# Patient Record
Sex: MALE | Race: WHITE | HISPANIC OR LATINO | Employment: FULL TIME | URBAN - METROPOLITAN AREA
[De-identification: names, ages, dates, MRNs, and addresses within clinical notes are randomized per-mention and may not be internally consistent; named-entity substitution may affect disease eponyms.]

---

## 2020-02-11 ENCOUNTER — OFFICE VISIT (OUTPATIENT)
Dept: URGENT CARE | Facility: CLINIC | Age: 50
End: 2020-02-11
Payer: COMMERCIAL

## 2020-02-11 VITALS
DIASTOLIC BLOOD PRESSURE: 72 MMHG | SYSTOLIC BLOOD PRESSURE: 120 MMHG | HEART RATE: 62 BPM | RESPIRATION RATE: 18 BRPM | BODY MASS INDEX: 27.32 KG/M2 | WEIGHT: 170 LBS | HEIGHT: 66 IN

## 2020-02-11 DIAGNOSIS — Z02.83 ENCOUNTER FOR DRUG SCREENING: ICD-10-CM

## 2020-02-11 DIAGNOSIS — S39.012A STRAIN OF LUMBAR REGION, INITIAL ENCOUNTER: Primary | ICD-10-CM

## 2020-02-11 DIAGNOSIS — S39.012A STRAIN OF LUMBAR PARASPINOUS MUSCLE, INITIAL ENCOUNTER: ICD-10-CM
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
## 2020-02-11 DIAGNOSIS — M54.41 ACUTE RIGHT-SIDED LOW BACK PAIN WITH RIGHT-SIDED SCIATICA: ICD-10-CM

## 2020-02-11 LAB
CTP QC/QA: YES
POC 5 PANEL DRUG SCREEN: NEGATIVE

## 2020-02-11 PROCEDURE — 80305 DRUG TEST PRSMV DIR OPT OBS: CPT | Mod: QW,S$GLB,, | Performed by: NURSE PRACTITIONER

## 2020-02-11 PROCEDURE — 72110 X-RAY EXAM L-2 SPINE 4/>VWS: CPT | Mod: FY,S$GLB,, | Performed by: RADIOLOGY

## 2020-02-11 PROCEDURE — 80305 POCT RAPID DRUG SCREEN 5 PANEL: ICD-10-PCS | Mod: QW,S$GLB,, | Performed by: NURSE PRACTITIONER

## 2020-02-11 PROCEDURE — 99204 PR OFFICE/OUTPT VISIT, NEW, LEVL IV, 45-59 MIN: ICD-10-PCS | Mod: S$GLB,,, | Performed by: NURSE PRACTITIONER

## 2020-02-11 PROCEDURE — 99204 OFFICE O/P NEW MOD 45 MIN: CPT | Mod: S$GLB,,, | Performed by: NURSE PRACTITIONER

## 2020-02-11 PROCEDURE — 72110 XR LUMBAR SPINE COMPLETE 5 VIEW: ICD-10-PCS | Mod: FY,S$GLB,, | Performed by: RADIOLOGY

## 2020-02-11 RX ORDER — ASPIRIN 81 MG/1
81 TABLET ORAL EVERY 4 HOURS PRN
COMMUNITY

## 2020-02-11 NOTE — LETTER
[facility]  [address]  [place] [address]  Phone: [phone]  Fax: [phone]  [facility]: [id number]    Pt Name: Kulwinder Rodriguez  Injury Date: 02/06/2020   Employee ID: [id number] Date of First Treatment: 02/11/2020   Company: [employer]      Appointment Time: 09:10 AM Arrived: 9:18 a.m.   Provider: FANI Stallings Time Out: 10:39 a.m.     Office Treatment:   EXAM  X-Ray  Drug Screen    1. Strain of lumbar region, initial encounter    2. Acute right-sided low back pain with right-sided sciatica    3. Strain of lumbar paraspinous muscle, initial encounter    4. Work related injury    5. Encounter for drug screening          Patient Instructions: Attention not to aggravate affected area, Apply ice 24-48 hours then apply heat/warm soaks(ibuprofen for pain)    Restrictions: Avoid frequent bending/lifting/twisting, No lifting/pushing/pulling more than 10 lbs     Return Appointment: 2/18/2020 at 1:30 p.m .  [place]

## 2020-02-11 NOTE — PROGRESS NOTES
Subjective:       Patient ID: Kulwinder Rodriguez is a 49 y.o. male.    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 Later on his states the pain started to radiate down his right leg. He went to the nurse onsite and was given bio freeze and aspirin to use at home. Patient presents today with complaint of no change since injuring and sharp/throbbing pain. He also has pain and discomfort in upper back. Pain level 6/10 on today. [place]      Back Pain   This is a new problem. The current episode started 1 to 4 weeks ago. The problem occurs constantly. The problem is unchanged. The pain is present in the lumbar spine. Quality: sharp and throbbing. The pain radiates to the right thigh. The pain is at a severity of 6/10. The pain is moderate. The pain is the same all the time. The symptoms are aggravated by bending. Pertinent negatives include no abdominal pain, bladder incontinence, bowel incontinence, chest pain, dysuria, fever, headaches, leg pain, numbness, paresis, paresthesias, pelvic pain, perianal numbness, tingling, weakness or weight loss. He has tried analgesics (biofreeze) for the symptoms. The treatment provided mild relief.       Constitution: Negative for fatigue and fever.   HENT: Negative.    Neck: negative.   Cardiovascular: Negative.  Negative for chest pain.   Eyes: Negative.    Respiratory: Negative.    Gastrointestinal: Negative for abdominal pain and bowel incontinence.   Endocrine: negative.   Genitourinary: Negative for dysuria, urgency, bladder incontinence, hematuria and pelvic pain.   Musculoskeletal: Positive for pain, back pain, pain with walking and muscle cramps. Negative for history of spine disorder.   Skin: Negative for rash.   Allergic/Immunologic: Negative.    Neurological: Negative for coordination disturbances, headaches, numbness and tingling.    Hematologic/Lymphatic: Negative.    Psychiatric/Behavioral: Negative.         Objective:      Physical Exam   Constitutional: He appears well-developed and well-nourished. He is active. No distress.   HENT:   Head: Normocephalic and atraumatic.   Right Ear: Hearing and external ear normal.   Left Ear: Hearing and external ear normal.   Nose: Nose normal. No nasal deformity. No epistaxis.   Mouth/Throat: Oropharynx is clear and moist and mucous membranes are normal.   Eyes: Conjunctivae and lids are normal. Right conjunctiva is not injected. Left conjunctiva is not injected.   Neck: Trachea normal and normal range of motion. No spinous process tenderness and no muscular tenderness present.   Cardiovascular: Intact distal pulses and normal pulses.   Pulses:       Dorsalis pedis pulses are 2+ on the right side, and 2+ on the left side.        Posterior tibial pulses are 2+ on the right side, and 2+ on the left side.   Pulmonary/Chest: Effort normal. No stridor. No respiratory distress.   Abdominal: Normal appearance.   Musculoskeletal: He exhibits edema and tenderness.        Cervical back: Normal.        Thoracic back: Normal.        Lumbar back: He exhibits decreased range of motion, tenderness, pain and spasm. He exhibits no deformity and normal pulse.        Back:    Neurological: He is alert. He has normal strength and normal reflexes. No sensory deficit. GCS eye subscore is 4. GCS verbal subscore is 5. GCS motor subscore is 6.   Reflex Scores:       Patellar reflexes are 2+ on the right side and 2+ on the left side.       Achilles reflexes are 2+ on the right side and 2+ on the left side.  SLR negative bilaterally.    Skin: Skin is warm, dry and intact. No abrasion and no bruising noted.   Psychiatric: He has a normal mood and affect. His speech is normal and behavior is normal. Thought content normal. Cognition and memory are normal. He is attentive.   Nursing note and vitals reviewed.    X-ray Lumbar Spine  Complete 5 View    Result Date: 2/11/2020  EXAMINATION: XR LUMBAR SPINE COMPLETE 5 VIEW CLINICAL HISTORY: Low back pain, <6wks, no red flags, no prior management;WORKERS COMP;Lumbago with sciatica, right side TECHNIQUE: AP, lateral, spot and bilateral oblique views of the lumbar spine were performed. COMPARISON: None FINDINGS: No acute fracture with preserved vertebral body heights and pedicles.  Some scattered tiny anterolateral end plate osteophytes.  No spondylolysis or spondylolisthesis.  Some mild degenerative changes about the L4-L5 and L5-S1 facet articulations.  Questionable mild disc narrowing at the lumbosacral level, disc space levels otherwise preserved.  Visualized SI joints and hip joint spaces intact.     No acute fracture, some mild degenerative changes as noted Electronically signed by: Chava Montana Date:    02/11/2020 Time:    10:15      Assessment:       1. Strain of lumbar region, initial encounter    2. Acute right-sided low back pain with right-sided sciatica    3. Strain of lumbar paraspinous muscle, initial encounter    4. Work related injury        Plan:            Patient Instructions: Attention not to aggravate affected area, Apply ice 24-48 hours then apply heat/warm soaks(ibuprofen for pain)   Restrictions: Avoid frequent bending/lifting/twisting, No lifting/pushing/pulling more than 10 lbs  Follow up in about 1 week (around 2/18/2020).

## 2020-02-18 ENCOUNTER — OFFICE VISIT (OUTPATIENT)
Dept: URGENT CARE | Facility: CLINIC | Age: 50
End: 2020-02-18
Payer: COMMERCIAL

## 2020-02-18 DIAGNOSIS — M54.41 ACUTE RIGHT-SIDED LOW BACK PAIN WITH RIGHT-SIDED SCIATICA: Primary | ICD-10-CM

## 2020-02-18 DIAGNOSIS — S39.012D STRAIN OF LUMBAR REGION, SUBSEQUENT ENCOUNTER: ICD-10-CM

## 2020-02-18 PROCEDURE — 99214 PR OFFICE/OUTPT VISIT, EST, LEVL IV, 30-39 MIN: ICD-10-PCS | Mod: S$GLB,,, | Performed by: PREVENTIVE MEDICINE

## 2020-02-18 PROCEDURE — 99214 OFFICE O/P EST MOD 30 MIN: CPT | Mod: S$GLB,,, | Performed by: PREVENTIVE MEDICINE

## 2020-02-18 RX ORDER — ETODOLAC 400 MG/1
400 TABLET, FILM COATED ORAL 2 TIMES DAILY
Qty: 60 TABLET | Refills: 1 | Status: SHIPPED | OUTPATIENT
Start: 2020-02-18 | End: 2020-03-03 | Stop reason: SDUPTHER

## 2020-02-18 RX ORDER — METHOCARBAMOL 500 MG/1
500 TABLET, FILM COATED ORAL NIGHTLY
Qty: 30 TABLET | Refills: 1 | Status: SHIPPED | OUTPATIENT
Start: 2020-02-18 | End: 2020-03-03 | Stop reason: SDUPTHER

## 2020-02-18 NOTE — PROGRESS NOTES
Subjective:       Patient ID: Kulwinder Rodriguez is a 49 y.o. male.    Chief Complaint: Back Pain    Pt returned to the clinic today for a follow up visit for back pain. Pt states his injury has not improved or changed since his last visit. Pt takes ibuprofen for his back and it helps relieves some of the pain. IJ      Back Pain   This is a recurrent problem. The current episode started 1 to 4 weeks ago. The problem occurs constantly. The problem is unchanged. The pain is present in the lumbar spine. Quality: sharp and throbbing. The pain radiates to the right thigh. The pain is at a severity of 6/10. The pain is moderate. The pain is the same all the time. The symptoms are aggravated by bending. Pertinent negatives include no abdominal pain, bladder incontinence, bowel incontinence, chest pain, dysuria, fever, headaches, leg pain, numbness, paresis, paresthesias, pelvic pain, perianal numbness, tingling, weakness or weight loss. He has tried analgesics (biofreeze) for the symptoms. The treatment provided mild relief.       Constitution: Negative for fever.   Cardiovascular: Negative for chest pain.   Gastrointestinal: Negative for abdominal pain and bowel incontinence.   Genitourinary: Negative for dysuria, bladder incontinence and pelvic pain.   Musculoskeletal: Positive for back pain.   Neurological: Negative for headaches and numbness.        Objective:      Physical Exam   Constitutional: He is oriented to person, place, and time. He appears well-developed and well-nourished.   HENT:   Head: Normocephalic.   Eyes: Pupils are equal, round, and reactive to light.   Neck: Normal range of motion.   Cardiovascular: Normal rate.   Pulmonary/Chest: Effort normal.   Musculoskeletal: Normal range of motion.        Lumbar back: He exhibits tenderness, pain and spasm. He exhibits no swelling, no edema, no deformity and no laceration.        Back:    Patient has complaints of pain about his right low back radiating to his right  leg with both palpation and range of motion testing.  He has pain with forward flexion of his back to 90°, extension to 10°, and lateral bending and rotation to 25°.  He has no motor or sensory deficits about his lower extremities.    There is no evidence of symptom magnification with negative axial loading and pseudo trunk rotation.   Neurological: He is alert and oriented to person, place, and time.   No focal neurologic deficits   Skin: Skin is warm and dry.   Psychiatric: He has a normal mood and affect.   Nursing note and vitals reviewed.    X-ray Lumbar Spine Complete 5 View    Result Date: 2/11/2020  EXAMINATION: XR LUMBAR SPINE COMPLETE 5 VIEW CLINICAL HISTORY: Low back pain, <6wks, no red flags, no prior management;WORKERS COMP;Lumbago with sciatica, right side TECHNIQUE: AP, lateral, spot and bilateral oblique views of the lumbar spine were performed. COMPARISON: None FINDINGS: No acute fracture with preserved vertebral body heights and pedicles.  Some scattered tiny anterolateral end plate osteophytes.  No spondylolysis or spondylolisthesis.  Some mild degenerative changes about the L4-L5 and L5-S1 facet articulations.  Questionable mild disc narrowing at the lumbosacral level, disc space levels otherwise preserved.  Visualized SI joints and hip joint spaces intact.     No acute fracture, some mild degenerative changes as noted Electronically signed by: Chava Montana Date:    02/11/2020 Time:    10:15  Assessment:       1. Acute right-sided low back pain with right-sided sciatica    2. Strain of lumbar region, subsequent encounter        Plan:     discussed results of this patient's x-rays of the lumbosacral spine which revealed no acute fracture or bony abnormality.  He will begin physical therapy to his right low back with exercises demonstrated in the office.       Patient Instructions: Daily home exercises/warm soaks, Begin Physical Therapy, PT to be scheduled once authorized   Restrictions: No  lifting/pushing/pulling more than 10 lbs, Avoid frequent bending/lifting/twisting  Follow up in about 2 weeks (around 3/3/2020).

## 2020-03-03 ENCOUNTER — OFFICE VISIT (OUTPATIENT)
Dept: URGENT CARE | Facility: CLINIC | Age: 50
End: 2020-03-03
Payer: COMMERCIAL

## 2020-03-03 DIAGNOSIS — S39.012D STRAIN OF LUMBAR REGION, SUBSEQUENT ENCOUNTER: ICD-10-CM

## 2020-03-03 DIAGNOSIS — M54.41 ACUTE RIGHT-SIDED LOW BACK PAIN WITH RIGHT-SIDED SCIATICA: Primary | ICD-10-CM

## 2020-03-03 PROCEDURE — 99214 OFFICE O/P EST MOD 30 MIN: CPT | Mod: S$GLB,,, | Performed by: PREVENTIVE MEDICINE

## 2020-03-03 PROCEDURE — 99214 PR OFFICE/OUTPT VISIT, EST, LEVL IV, 30-39 MIN: ICD-10-PCS | Mod: S$GLB,,, | Performed by: PREVENTIVE MEDICINE

## 2020-03-03 RX ORDER — METHOCARBAMOL 500 MG/1
500 TABLET, FILM COATED ORAL NIGHTLY
Qty: 30 TABLET | Refills: 1 | Status: SHIPPED | OUTPATIENT
Start: 2020-03-03 | End: 2020-04-28 | Stop reason: SDUPTHER

## 2020-03-03 RX ORDER — ETODOLAC 400 MG/1
400 TABLET, FILM COATED ORAL 2 TIMES DAILY
Qty: 60 TABLET | Refills: 1 | Status: SHIPPED | OUTPATIENT
Start: 2020-03-03 | End: 2020-04-28 | Stop reason: SDUPTHER

## 2020-03-03 NOTE — LETTER
[facility]  [address]  [place] [address]  Phone: [phone]  Fax: [phone]  [facility]: [id number]    Pt Name: Kulwinder Rodriguez  Injury Date: 02/06/2020   Employee ID: [id number] Date of Treatment: 03/03/2020   Company: [employer]      Appointment Time: 01:15 PM Arrived: 1:30 PM   Provider: Ayush Gaston MD Time Out: 2:45 PM     Office Treatment:   EXAM  RX GIVEN  BEGAN PHYSICAL THERAPY   LIGHT DUTY     1. Acute right-sided low back pain with right-sided sciatica    2. Strain of lumbar region, subsequent encounter      Medications Ordered This Encounter   Medications    etodolac (LODINE) 400 MG tablet    methocarbamol (ROBAXIN) 500 MG Tab      Patient Instructions: Daily home exercises/warm soaks, Begin Physical Therapy, PT to be scheduled once authorized    Restrictions: No lifting/pushing/pulling more than 10 lbs, Avoid frequent bending/lifting/twisting     Return Appointment: 03/10/2020 at 12:30 PM  CHRISTIAN

## 2020-03-03 NOTE — PROGRESS NOTES
Subjective:       Patient ID: Kulwinder Rodriguez is a 49 y.o. male.    Chief Complaint: Back Pain    F/U- Lower back (DOI 2/6/2020) [place]- Patient states his back feels the same and he still has sharp shooting pain off and on. He states the medication does make him feel a little better. He is awaiting approval for physical therapy and has not heard anything back about it. He is currently taking Robaxin and Etodolac. Pain level 6/10 on today. [place]    Back Pain   This is a recurrent problem. The current episode started 1 to 4 weeks ago. The problem occurs constantly. The problem is unchanged. The pain is present in the lumbar spine. The quality of the pain is described as shooting (sharp). The pain does not radiate. The pain is at a severity of 6/10. The pain is moderate. The pain is the same all the time. The symptoms are aggravated by bending. Stiffness is present in the morning. Pertinent negatives include no abdominal pain, bladder incontinence, bowel incontinence, chest pain, dysuria, fever, headaches, leg pain, numbness, paresis, paresthesias, pelvic pain, perianal numbness, tingling, weakness or weight loss. He has tried NSAIDs and muscle relaxant for the symptoms. The treatment provided moderate relief.       Constitution: Negative for fatigue and fever.   Cardiovascular: Negative for chest pain.   Eyes: Negative.    Respiratory: Negative.    Gastrointestinal: Negative for abdominal pain and bowel incontinence.   Endocrine: negative.   Genitourinary: Negative for dysuria, urgency, bladder incontinence, hematuria and pelvic pain.   Musculoskeletal: Positive for pain, back pain and muscle ache. Negative for muscle cramps and history of spine disorder.   Skin: Negative for rash.   Allergic/Immunologic: Negative.    Neurological: Negative for coordination disturbances, headaches, numbness and tingling.   Hematologic/Lymphatic: Negative.    Psychiatric/Behavioral: Negative.         Objective:      Physical Exam    Constitutional: He is oriented to person, place, and time. He appears well-developed and well-nourished.   HENT:   Head: Normocephalic.   Eyes: Pupils are equal, round, and reactive to light.   Neck: Normal range of motion.   Cardiovascular: Normal rate.   Pulmonary/Chest: Effort normal.   Musculoskeletal: Normal range of motion.        Lumbar back: He exhibits tenderness, pain and spasm. He exhibits no swelling, no edema, no deformity and no laceration.        Back:    Patient continues to complain of pain about his right low back radiating to his right leg with both palpation and range of motion testing.  He has pain with forward flexion of his back to 90°, extension to 10°, and lateral bending and rotation to 25°.  He has no motor or sensory deficits about his lower extremities.    There is no evidence of symptom magnification with negative axial loading and pseudo trunk rotation.   Neurological: He is alert and oriented to person, place, and time.   No focal neurologic deficits   Skin: Skin is warm and dry.   Psychiatric: He has a normal mood and affect.   Nursing note and vitals reviewed.      Assessment:       1. Acute right-sided low back pain with right-sided sciatica    2. Strain of lumbar region, subsequent encounter        Plan:     awaiting physical therapy to this patient's lower back.  He will continue restricted duty, warm soaks with exercises daily.    Medications Ordered This Encounter   Medications    etodolac (LODINE) 400 MG tablet     Sig: Take 1 tablet (400 mg total) by mouth 2 (two) times daily.     Dispense:  60 tablet     Refill:  1    methocarbamol (ROBAXIN) 500 MG Tab     Sig: Take 1 tablet (500 mg total) by mouth nightly.     Dispense:  30 tablet     Refill:  1     Patient Instructions: Daily home exercises/warm soaks, Begin Physical Therapy, PT to be scheduled once authorized   Restrictions: No lifting/pushing/pulling more than 10 lbs, Avoid frequent bending/lifting/twisting  Follow  up in about 1 week (around 3/10/2020).

## 2020-03-10 ENCOUNTER — OFFICE VISIT (OUTPATIENT)
Dept: URGENT CARE | Facility: CLINIC | Age: 50
End: 2020-03-10
Payer: COMMERCIAL

## 2020-03-10 DIAGNOSIS — M54.41 ACUTE RIGHT-SIDED LOW BACK PAIN WITH RIGHT-SIDED SCIATICA: Primary | ICD-10-CM

## 2020-03-10 DIAGNOSIS — S39.012D STRAIN OF LUMBAR REGION, SUBSEQUENT ENCOUNTER: ICD-10-CM

## 2020-03-10 PROCEDURE — 99214 OFFICE O/P EST MOD 30 MIN: CPT | Mod: S$GLB,,, | Performed by: PREVENTIVE MEDICINE

## 2020-03-10 PROCEDURE — 99214 PR OFFICE/OUTPT VISIT, EST, LEVL IV, 30-39 MIN: ICD-10-PCS | Mod: S$GLB,,, | Performed by: PREVENTIVE MEDICINE

## 2020-03-10 NOTE — LETTER
[facility]  [address] [place] [address]  Phone: [phone]  Fax: [phone]  [facility]: [id number]    Pt Name: Kulwinder Rodriguez  Injury Date: 02/06/2020   Employee ID:[id number] Date of Treatment: 03/10/2020   Company: [employer]      Appointment Time: 12:15 PM Arrived: 12:30 PM   Provider: Ayush Gaston MD Time Out: 2:25 PM     Office Treatment:   EXAM  PT TO BE SCHEDULED ONCE AUTHORIZED   LIGHT DUTY      1. Acute right-sided low back pain with right-sided sciatica    2. Strain of lumbar region, subsequent encounter          Patient Instructions: Daily home exercises/warm soaks, Begin Physical Therapy, PT to be scheduled once authorized    Restrictions: No lifting/pushing/pulling more than 10 lbs, Avoid frequent bending/lifting/twisting     Return Appointment: 03/31/2020 at 11:30 AM  IJ

## 2020-03-10 NOTE — PROGRESS NOTES
Subjective:       Patient ID: Kulwinder Rodriguez is a 49 y.o. male.    Chief Complaint: Back Pain    Pt returned to the clinic today for a follow up visit for back pain. Pt states his pain is a 6/10. Constant and pt have not started physical therapy yet. IJ    Back Pain   This is a recurrent problem. The current episode started 1 to 4 weeks ago. The problem occurs constantly. The problem is unchanged. The pain is present in the lumbar spine. The quality of the pain is described as shooting (sharp). The pain does not radiate. The pain is at a severity of 6/10. The pain is moderate. The pain is the same all the time. The symptoms are aggravated by bending. Stiffness is present in the morning. Pertinent negatives include no abdominal pain, bladder incontinence, bowel incontinence, chest pain, dysuria, fever, headaches, leg pain, numbness, paresis, paresthesias, pelvic pain, perianal numbness, tingling, weakness or weight loss. He has tried NSAIDs and muscle relaxant for the symptoms. The treatment provided moderate relief.       Constitution: Negative for fever.   Cardiovascular: Negative for chest pain.   Gastrointestinal: Negative for abdominal pain and bowel incontinence.   Genitourinary: Negative for dysuria, bladder incontinence and pelvic pain.   Musculoskeletal: Positive for back pain.   Neurological: Negative for headaches and numbness.        Objective:      Physical Exam   Constitutional: He is oriented to person, place, and time. He appears well-developed and well-nourished.   HENT:   Head: Normocephalic.   Eyes: Pupils are equal, round, and reactive to light.   Neck: Normal range of motion.   Cardiovascular: Normal rate.   Pulmonary/Chest: Effort normal.   Musculoskeletal: Normal range of motion.        Lumbar back: He exhibits tenderness, pain and spasm. He exhibits no swelling, no edema, no deformity and no laceration.        Back:    Patient continues to complain of pain about his right low back radiating to  his right leg with both palpation and range of motion testing.  He has pain with forward flexion of his back to 90°, extension to 10°, and lateral bending and rotation to 25°.  He has no motor or sensory deficits about his lower extremities.    There is no evidence of symptom magnification with negative axial loading and pseudo trunk rotation.   Neurological: He is alert and oriented to person, place, and time.   No focal neurologic deficits   Skin: Skin is warm and dry.   Psychiatric: He has a normal mood and affect.   Nursing note and vitals reviewed.      Assessment:       1. Acute right-sided low back pain with right-sided sciatica    2. Strain of lumbar region, subsequent encounter        Plan:     patient is physical therapy has not yet begun and this was to be done a soon as possible.  He will continue taking etodolac 400 mg twice a day with food and Robaxin 500 mg nightly.  He will remain on restricted light duty for the next 3 weeks and get therapy 3 times per week.       Patient Instructions: Daily home exercises/warm soaks, Begin Physical Therapy, PT to be scheduled once authorized   Restrictions: No lifting/pushing/pulling more than 10 lbs, Avoid frequent bending/lifting/twisting  Follow up in about 3 weeks (around 3/31/2020).

## 2020-03-14 ENCOUNTER — NURSE TRIAGE (OUTPATIENT)
Dept: ADMINISTRATIVE | Facility: CLINIC | Age: 50
End: 2020-03-14

## 2020-03-15 NOTE — TELEPHONE ENCOUNTER
Cough; SOB; weakness; headache, runny. Afebrile. Wants to know where he could get tested. Advised that he did not meet testing criteria. Verbalized understanding.     Reason for Disposition   [1] No CORONAVIRUS EXPOSURE BUT [2] questions about    Additional Information   Negative: Severe difficulty breathing (e.g., struggling for each breath, speak in single words, bluish lips)   Negative: Sounds like a life-threatening emergency to the triager   Negative: [1] Difficulty breathing occurs AND [2] within 14 days of CORONAVIRUS EXPOSURE   Negative: Patient sounds very sick or weak to the triager   Negative: [1] Fever or feeling feverish AND [2] within 14 Days of CORONAVIRUS EXPOSURE   Negative: [1] Cough occurs AND [2] within 14 days of CORONAVIRUS EXPOSURE   Negative: [1] Fever or feeling feverish AND [2] symptoms of lower respiratory illness (e.g., cough, difficulty breathing) AND [3] TRAVEL FROM [place] within last 14 days   Negative: [1] Body aches, chills, diarrhea, headache, runny nose, or sore throat occur AND [2] within 14 days of CORONAVIRUS EXPOSURE   Negative: [1] CORONAVIRUS EXPOSURE within last 14 days AND [2] NO cough, fever, or breathing difficulty   Negative: [1] TRAVEL FROM [place] in last 14 days AND  [2] NO cough or fever or breathing difficulty    Protocols used: CORONAVIRUS (2019-NCOV) EXPOSURE-A-

## 2020-03-18 ENCOUNTER — CLINICAL SUPPORT (OUTPATIENT)
Dept: REHABILITATION | Facility: HOSPITAL | Age: 50
End: 2020-03-18
Payer: COMMERCIAL

## 2020-03-18 DIAGNOSIS — M54.41 ACUTE RIGHT-SIDED LOW BACK PAIN WITH RIGHT-SIDED SCIATICA: ICD-10-CM

## 2020-03-18 PROBLEM — M54.50 LOWER BACK PAIN: Status: ACTIVE | Noted: 2020-03-18

## 2020-03-18 PROCEDURE — 97110 THERAPEUTIC EXERCISES: CPT | Mod: PN

## 2020-03-18 PROCEDURE — 97140 MANUAL THERAPY 1/> REGIONS: CPT | Mod: PN

## 2020-03-18 PROCEDURE — 97161 PT EVAL LOW COMPLEX 20 MIN: CPT | Mod: PN

## 2020-03-18 NOTE — PLAN OF CARE
[facility]  Physical Therapy Initial Evaluation    Name: Kulwinder Rodriguez  Clinic Number: [id number]    Therapy Diagnosis:   Encounter Diagnosis   Name Primary?    Acute right-sided low back pain with right-sided sciatica      Physician: Ayush Gaston MD    Physician Orders: PT Eval and Treat   Medical Diagnosis from Referral:   M54.41 (ICD-10-CM) - Acute right-sided low back pain with right-sided sciatica   S39.012D (ICD-10-CM) - Strain of lumbar region, subsequent encounter     Evaluation Date: 3/18/2020  Authorization Period Expiration: 2/17/2021  Plan of Care Expiration: 4/17  Visit # / Visits authorized: 1/ 12    Time In: 800am  Time Out: 900am  Total Appointment Time (timed & untimed codes): 60 minutes    Precautions: Standard    Subjective   Date of injury: 2/6/2020  History of current condition - KULWINDER reports: he was flipping a can cylinder and pulled his lower back on the right. Afterwards followed up with MD and was Rx meds. Reports that the medications helps a little when his lower back gets tight, but does not take the pain away. Reports that he is currently working - light duty. 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   Reports overall since his injury - reports some days much better and some days feels about the same - not any worse.   Works 4 10 hour days and 3 days off.     Per provider initial note:   W/C New injury- Lower back (DOI 2/6/2020)- Patient states he was flipping a can cylinder and felt a pull in his back and he continued to work. Later on his states the pain started to radiate down his right leg. He went to the nurse onsite and was given bio freeze and aspirin to use at home. Patient presents today with complaint of no change since injuring and sharp/throbbing pain. He also has pain and discomfort in upper back. Pain level 6/10 on today.   This is a new problem. The current episode started 1 to 4  weeks ago. The problem occurs constantly. The problem is unchanged. The pain is present in the lumbar spine. Quality: sharp and throbbing. The pain radiates to the right thigh. The pain is at a severity of 6/10. The pain is moderate. The pain is the same all the time. The symptoms are aggravated by bending. Pertinent negatives include no abdominal pain, bladder incontinence, bowel incontinence, chest pain, dysuria, fever, headaches, leg pain, numbness, paresis, paresthesias, pelvic pain, perianal numbness, tingling, weakness or weight loss. He has tried analgesics (biofreeze) for the symptoms. The treatment provided mild relief.      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   Prior Medical Treatment: Medication and Imaging  Current Work Restrictions: Restrictions: No lifting/pushing/pulling more than 10 lbs, Avoid frequent bending/lifting/twisting  Next MD follow up: Follow up in about 3 weeks (around 3/31/2020).    Medical History:   No past medical history on file.    Surgical History:   Kulwinder Rodriguez  has no past surgical history on file.    Medications:   Kulwinder has a current medication list which includes the following prescription(s): aspirin, etodolac, menthol, and methocarbamol.    Allergies:   Review of patient's allergies indicates:  No Known Allergies     Imaging, Xray: No acute fracture with preserved vertebral body heights and pedicles.  Some scattered tiny anterolateral end plate osteophytes.  No spondylolysis or spondylolisthesis.  Some mild degenerative changes about the L4-L5 and L5-S1 facet articulations.  Questionable mild disc narrowing at the lumbosacral level, disc space levels otherwise preserved.  Visualized SI joints and hip joint spaces intact.     Social History: 0 BEATRIZ lives with their family, unable to cut his grass right now due to  LBP    Pain:  Current 5/10, worst 8/10, best 0/10   Location: Right side of the lower back mainly and when his s/s are worse he reports that his s/s travel down the posterior/lateral leg into the foot (desribes this an a numbness sensation)  Description: Aching and Sharp Occasionally   Aggravating Factors: Bending, Coughing/Sneezing, Flexing, Lifting and Sitting on a lower bench (after sits down on a lower chair has trouble moving from sitting to standing)  Alleviating Factors: meditation, relaxation and warm soak, Biofreeze    Pt's goals: Return to gainful employment         Reduce pain in his lower back and Right leg, improve stiffness in the lower back, get back to PLOF    Objective     Observations: Quiet stance with equal weight shift, ASIS/PSIS appears symmetrical    Lumbar AROM :   Lumbar AROM (*) = degrees current Comments   Flexion  46 R sided LBP and radiates into the R posterior upper leg    Extension  15 (with repeated extension no change in s/s and now 18*) R sided LBP    R Rotation  80 % no signs or symptoms, stiffness   L Rotation  50 % R sided LBP    R Side bending  21 no signs or symptoms   L Side bending   15 R sided LBP      - R/L Rot w/ hips stabilized, difference: No  - Quadrant R/L: (+) Right  - Charisse's sign with return to neutral: (-) though aberrant motion noted  - Tends to hinge with return to N from flexed position from approximately: L4/5 region  Neuro screening:   - Dermatomes: WFL  - Myotomes: WFL except for pain with testing with L2 testing on the L with c/o R sided LBP as well as R sided L4-S2 testing on the (R) though no weakness noted  - UMN screening: WNL  - Slump testing (for provocation of s/s): (+) for concordant R sided LBP and radicular s/s on the Right  - SLR (for provocation of s/s): (+) Right and also (+) Cross SLR test on the (L)  Palpation: increased lumbar paraspinal mm tone, denies ttp to lumbar paraspinals  Response to LAD and hooklying position: min relief with LAD    Joint PAIVM testing: No s/s with CPA; however, discomfort with R UPA at L5/S1 > L4/L5    Functional screening: lifting mechanics, pushing, pulling NT due to s/s today - will test at a later date when more appropriate    Physical Demand Level (PDL) Reference   Physical Demand Level (PDL) Occasional: 1-33% of the Workday (O) Frequent: 34-66% of the Workday (F) Constant: 67% of the Workday (C)   Sedentary (S) 0 - 10 lbs. Negligible Negligible   Light (L) 11 - 20 lbs. 0 - 10 lbs, Negligible   Medium (M) 21 - 50 lbs. 11 - 25lbs. 0 - 10 lbs   Heavy (H) 51 - 100 lbs. 26 - 50 lbs. 10 - 20 lbs.   Very Heavy (VH) 100 + lbs. 50+ lbs. 20+ lbs.        X lbs. Current PDL PDL Goal   Lifting Floor to Waist 10lb restrictions  Sedentary Heavy   Lifting Waist to Shoulder 10lb restrictions  Sedentary Heavy   Lifting Shoulder to Overhead 10lb restrictions  Sedentary Heavy   Pushing 10lb restrictions  Sedentary Heavy   Pulling 10lb restrictions  Sedentary Heavy   Carrying 10lb restrictions  Sedentary Heavy       Limitation/Restriction for FOTO Lumbar Survey    Therapist reviewed FOTO scores for Kulwinder Rodriguez on 3/18/2020.   FOTO documents entered into [employer] - see Media section.    Limitation Score: 44%  Goal: 28%       TREATMENT   Treatment Time In: 835  Treatment Time Out: 900  Total Treatment time (time-based codes) separate from Evaluation: 25 minutes       KULWINDER received the following treatment separate from the evaluation:     KULWINDER received therapeutic exercises to develop strength, endurance, ROM, flexibility and core stabilization for 15 minutes including:  - prone press up onto forearms   2x10  - Supine LTR      x20  - Supine     KULWINDER received the following manual therapy techniques: Joint mobilizations, Manual traction and Soft tissue Mobilization were applied to the: lumbar spine and R LE for 10 minutes, including:  - R LE LAD 3' in hip open packed position in supine  - Prone lumbar paraspinal STM (R)  - Prone R UPA L4-S1 Gr  "IV- 45" x 3    Home Exercises and Patient Education Provided    Education provided:   - POC, HEP    Written Home Exercises Provided: yes.  Exercises were reviewed and KULWINDER was able to demonstrate them prior to the end of the session.  KULWINDER demonstrated good  understanding of the education provided.     See EMR under Patient Instructions for exercises provided 3/18/2020.    Assessment   Kulwinder is a 49 y.o. male referred to outpatient Physical Therapy with a medical diagnosis of acute strain of the lumbar spine. Pt presents with chief c/o R sided lower back pain with referred pain into the R LE due to a work related injury with no reported Hx of prior LBP.   He presents with R sided lower back pain with AROM screening with referred pain into the R LE with lumbar flexion as well as + Slump and + SLR testing. Though this is the case, he does not display any hard neurological signs as dermatomes, myotomes WNL at this time.   His current impairments include: lower back pain and referred pain into the R LE, pain with lumbar AROM especially with lumbar flexion (no changes with lumbar extension), pain with PAIVM testing with functional limitations of Bending, Coughing/Sneezing, Flexing, Lifting and Sitting on a lower bench (after sits down on a lower chair has trouble moving from sitting to standing).     Current PDL: Light  Return to Work PDL: Heavy    Pt prognosis is Good.     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     Plan of care discussed with patient: Yes  Pt's spiritual, cultural and educational needs considered and patient is agreeable to the plan of care and goals as stated below:     Anticipated Barriers for therapy: acuity of current injury    Medical Necessity is demonstrated by the following  History  Co-morbidities and " personal factors that may impact the plan of care Co-morbidities:   none    Personal Factors:   lifestyle     low   Examination  Body Structures and Functions, activity limitations and participation restrictions that may impact the plan of care Body Regions:   back  lower extremities    Body Systems:    gross symmetry  ROM  strength    Participation Restrictions:   Current work related restrictions    Activity limitations:   Learning and applying knowledge  no deficits    General Tasks and Commands  no deficits    Communication  no deficits    Mobility  lifting and carrying objects    Self care  no deficits    Domestic Life  assisting others    Interactions/Relationships  no deficits    Life Areas  employment    Community and Social Life  community life  recreation and leisure         high   Clinical Presentation evolving clinical presentation with changing clinical characteristics moderate   Decision Making/ Complexity Score: low       Goals:     Short Term Goals: 2weeks   1.) Pt will become compliant and independent with her initial HEP to promote decreased pain and improved mobility and strength.   2) Pt to report decrease in pain levels from 8/10 at worse to 6/10 at worse.   3.) Pt will demonstrate a negative SLR/Slump testing to demonstrate improvements with neurodynamics.    4.) Pt will demonstrate full lumbar AROM in all planes with min to no c/o s/s to improve general mobility.   5.) Pt able to demonstrate ability to lift from floor to waist, waist to shoulder, waist to overhead of 10# to demonstrate improvements and progress with work related tasks.     Long Term Goals: 4 weeks   1.) Pt will become compliant and independent with an updated, progressed HEP to promote decreased pain and improved mobility and strength as well as prep for discharge from further PT intervention.   2) Pt to report decrease in pain levels from 8/10 at worse to 4/10 at worse.   3.) Pt will demonstrate full lumbar AROM in all planes  with no c/o s/s to improve general mobility.   4.) Pt able to demonstrate ability to lift from floor to waist, waist to shoulder, waist to overhead of 25# to demonstrate improvements and progress with work related tasks.   5.) Pt will improve her FOTO score from 44% impairment to 28% improvement to indicate improvement in overall function.        Pt will return to work full duty, full time.    Plan   Plan of care Certification: 3/18/2020 to 4/17/2020    Outpatient Physical Therapy 3 times weekly for 4 weeks to include the following interventions: Cervical/Lumbar Traction, Gait Training, Manual Therapy, Moist Heat/ Ice, Neuromuscular Re-ed, Patient Education, Self Care, Therapeutic Activites, Therapeutic Exercise and FDN, Astym, Other soft tissue modalities as needed.     Upon discharge from further skilled PT intervention it will determined if the need for a work conditioning program or Functional Capacity Evaluation is required to allow the injured worker to return to work with full potential achieved, continued improvement with body mechanics with advanced functional activities, and further prevention of future work-related injuries.     Davon Enriquez, PT  3/18/2020

## 2020-03-19 ENCOUNTER — CLINICAL SUPPORT (OUTPATIENT)
Dept: REHABILITATION | Facility: HOSPITAL | Age: 50
End: 2020-03-19
Payer: COMMERCIAL

## 2020-03-19 DIAGNOSIS — M54.41 ACUTE RIGHT-SIDED LOW BACK PAIN WITH RIGHT-SIDED SCIATICA: ICD-10-CM

## 2020-03-19 PROCEDURE — 97110 THERAPEUTIC EXERCISES: CPT | Mod: PN

## 2020-03-19 PROCEDURE — 97140 MANUAL THERAPY 1/> REGIONS: CPT | Mod: PN

## 2020-03-19 NOTE — PROGRESS NOTES
Physical Therapy Daily Treatment Note     Name: Kulwinder Rodriguez  Clinic Number: [id number]    Therapy Diagnosis:   Encounter Diagnosis   Name Primary?    Acute right-sided low back pain with right-sided sciatica      Physician: Ayush Gaston MD    Visit Date: 3/19/2020    Physician Orders: PT Eval and Treat   Medical Diagnosis from Referral:   M54.41 (ICD-10-CM) - Acute right-sided low back pain with right-sided sciatica   S39.012D (ICD-10-CM) - Strain of lumbar region, subsequent encounter      Evaluation Date: 3/18/2020  Authorization Period Expiration: 2/17/2021  Plan of Care Expiration: 4/17  Visit # / Visits authorized: 1/ 12    Time In: 800am  Time Out: 859am  Total Billable Time: 59 minutes (2TE, 2MT)     Precautions: Standard    Subjective     Pt reports: ongoing R sided LBP. Reports a little stiff this morning, but pain local to the lower back this morning - denies any radiating/radicular s/s currently; however, also reports has not done much yet this morning.  He was compliant with home exercise program.  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   Prior Medical Treatment: Medication and Imaging  Current Work Restrictions: Restrictions: No lifting/pushing/pulling more than 10 lbs, Avoid frequent bending/lifting/twisting  Next MD follow up: Follow up in about 3 weeks (around 3/31/2020).    Objective     KULWINDER received therapeutic exercises to develop strength, endurance, ROM, flexibility and core stabilization for 23 minutes including:    - prone press up onto forearms                                  2x10  - Supine LTR                                      "                          x20  - Supine DKTC with Swiss ball   x20  - Supine sciatic nn glides    2x20  - Supine QL stretch (going to L, stretching R) 5" hold x 10  - Supine Bridging     2x10  - Supine TrA bracing     2x10  - Supine Marching holding TrA/PPT   2x10    - Prone press up position hold    3'  - Prone press up to elbow    3x10  - Prone alt arm leg lift     2x10    - SL hip abduction     3x10    Planned:   Seated Swiss ball roll out?     KULWINDER received the following manual therapy techniques: Joint mobilizations, Manual traction and Soft tissue Mobilization were applied to the: lumbar spine and R LE for 23 minutes, including:  - R LE LAD 4' in hip open packed position in supine  - Prone lumbar paraspinal STM (R) > (L) - deep tissue  - Prone R UPA L4-S1 Gr IV- 45" x 3 each segment    KULWINDER participated in dynamic functional therapeutic activities to improve functional performance for 0  minutes, including:    Future Planned Activities:   B shoulder OH lift on Airex pad  Axial Loading  Multifidi walking - lateral walks  Horizontal rows  B shoulder pull downs  Mini Squats  Lunging    Kulwinder received  x15 minutes to complete his session today.     Home Exercises Provided and Patient Education Provided     Education provided:   - POC, HEP maintenance, response to treatment    Written Home Exercises Provided: Patient instructed to cont prior HEP.  Exercises were reviewed and KULWINDER was able to demonstrate them prior to the end of the session.  KULWINDER demonstrated good  understanding of the education provided.     See EMR under Patient Instructions for exercises provided prior visit.    Assessment     Kulwinder is a 49 y.o. male referred to outpatient Physical Therapy with a medical diagnosis of acute strain of the lumbar spine. Pt presents with chief c/o R sided lower back pain with referred pain into the R LE due to a work related injury with no reported Hx of prior LBP.   Pt did c/o difficulty with activities today which " were modified within a comfortable ROM - I.e. Hip abduction, QL stretching, prone alt lifting. As today was his first visit he required marked cues for proper performance and body mechanics with treatment activities today. Will monitor carry over between sessions and adjust accordingly.   Also, due to first visit today the focus of today's session was foundational treatment activities and planning to progress as he is able to tolerate with functional treatment activities as outlined with planned treatment activities above.     Current PDL: Light  Return to Work PDL: Heavy    KASIA is progressing well towards his goals.   Pt prognosis is Good.     Pt will continue to benefit from skilled outpatient physical therapy to address the deficits listed in the problem list box on initial evaluation, provide pt/family education and to maximize pt's level of independence in the home and community environment.     Pt's spiritual, cultural and educational needs considered and pt agreeable to plan of care and goals.     Anticipated barriers to physical therapy: acuity of s/s    Goals:   Short Term Goals: 2weeks   1.) Pt will become compliant and independent with her initial HEP to promote decreased pain and improved mobility and strength.   2) Pt to report decrease in pain levels from 8/10 at worse to 6/10 at worse.   3.) Pt will demonstrate a negative SLR/Slump testing to demonstrate improvements with neurodynamics.    4.) Pt will demonstrate full lumbar AROM in all planes with min to no c/o s/s to improve general mobility.   5.) Pt able to demonstrate ability to lift from floor to waist, waist to shoulder, waist to overhead of 10# to demonstrate improvements and progress with work related tasks.      Long Term Goals: 4 weeks   1.) Pt will become compliant and independent with an updated, progressed HEP to promote decreased pain and improved mobility and strength as well as prep for discharge from further PT intervention.   2) Pt  to report decrease in pain levels from 8/10 at worse to 4/10 at worse.   3.) Pt will demonstrate full lumbar AROM in all planes with no c/o s/s to improve general mobility.   4.) Pt able to demonstrate ability to lift from floor to waist, waist to shoulder, waist to overhead of 25# to demonstrate improvements and progress with work related tasks.   5.) Pt will improve her FOTO score from 44% impairment to 28% improvement to indicate improvement in overall function.        Plan     Plan of care Certification: 3/18/2020 to 4/17/2020     Outpatient Physical Therapy 3 times weekly for 4 weeks to include the following interventions: Cervical/Lumbar Traction, Gait Training, Manual Therapy, Moist Heat/ Ice, Neuromuscular Re-ed, Patient Education, Self Care, Therapeutic Activites, Therapeutic Exercise and FDN, Astym, Other soft tissue modalities as needed.      Upon discharge from further skilled PT intervention it will determined if the need for a work conditioning program or Functional Capacity Evaluation is required to allow the injured worker to return to work with full potential achieved, continued improvement with body mechanics with advanced functional activities, and further prevention of future work-related injuries.       Davon Enriquez, PT

## 2020-03-20 ENCOUNTER — CLINICAL SUPPORT (OUTPATIENT)
Dept: REHABILITATION | Facility: HOSPITAL | Age: 50
End: 2020-03-20
Payer: COMMERCIAL

## 2020-03-20 DIAGNOSIS — M54.41 ACUTE RIGHT-SIDED LOW BACK PAIN WITH RIGHT-SIDED SCIATICA: ICD-10-CM

## 2020-03-20 PROCEDURE — 97110 THERAPEUTIC EXERCISES: CPT | Mod: PN

## 2020-03-20 PROCEDURE — 97140 MANUAL THERAPY 1/> REGIONS: CPT | Mod: PN

## 2020-03-20 NOTE — PROGRESS NOTES
Physical Therapy Daily Treatment Note     Name: Kulwinder Rodriguez  Clinic Number: [id number]    Therapy Diagnosis:   Encounter Diagnosis   Name Primary?    Acute right-sided low back pain with right-sided sciatica      Physician: Ayush Gaston MD    Visit Date: 3/20/2020    Physician Orders: PT Eval and Treat   Medical Diagnosis from Referral:   M54.41 (ICD-10-CM) - Acute right-sided low back pain with right-sided sciatica   S39.012D (ICD-10-CM) - Strain of lumbar region, subsequent encounter      Evaluation Date: 3/18/2020  Authorization Period Expiration: 2/17/2021  Plan of Care Expiration: 4/17/2020  Visit # / Visits authorized: 3/ 12    Time In: 700am  Time Out: 810am  Total Billable Time: 70 minutes (2TE, 2MT) (10 mins moist heat at end of session)    Precautions: Standard    Subjective     Pt reports: ongoing R sided LBP. Reports a little stiff this morning, but pain local to the lower back this morning - denies any radiating/radicular s/s currently; however, also reports has not done much yet this morning again today.   He was compliant with home exercise program.  Response to previous treatment: reports was sore and achy after his visit yesterday, but everything went ok. Reports most difficulty with prone alt arm/leg lift, SL hip abduction.   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   Prior Medical Treatment: Medication and Imaging  Current Work Restrictions: Restrictions: No lifting/pushing/pulling more than 10 lbs, Avoid frequent bending/lifting/twisting  Next MD follow up: Follow up in about 3 weeks (around 3/31/2020).    Objective     KULWINDER received therapeutic exercises to develop strength, endurance,  "ROM, flexibility and core stabilization for 35 minutes including:    - prone press up onto forearms                                  2x10  - Supine LTR                                                               x20  - Supine DKTC with Swiss ball   x20  - Supine sciatic nn glides    2x20  - Supine QL stretch (going to L, stretching R) 5" hold x 10  - Supine Bridging     2x10  - Supine TrA bracing     2x10  - Supine Marching holding TrA/PPT   2x10    - Prone press up position hold    3' (Out of Time)   - Prone press up to elbow    3x10  - Prone alt arm leg lift     2x10 (Out of Time)     - SL hip abduction     3x10    - Seated Swiss ball lumbar flexion roll out     x20  - Seated axial loading (band over shoulders, good to bad posture)  x20BTB    - Standing B shoulder scaption lift   2x10, 5# each UE     KULWINDER received the following manual therapy techniques: Joint mobilizations, Manual traction and Soft tissue Mobilization were applied to the: lumbar spine and R LE for 25 minutes, including:  - R LE LAD 4' in hip open packed position in supine (Out of Time)   - Prone lumbar paraspinal STM (R) > (L) - deep tissue   - Prone R UPA L4-S1 Gr IV- 45" x 3 each segment  - FDN to the R side lower lumbar paraspinals x4 with functional e-stim applied for 10 mins    KULWINDER participated in dynamic functional therapeutic activities to improve functional performance for 0  minutes, including:    Future Planned Activities:   Multifidi walking - lateral walks  Horizontal rows  B shoulder pull downs  Mini Squats  Lunging    Kulwinder received  x15 minutes to complete his session today.     Home Exercises Provided and Patient Education Provided     Education provided:   - POC, HEP maintenance, response to treatment    Written Home Exercises Provided: Patient instructed to cont prior HEP.  Exercises were reviewed and KULWINDER was able to demonstrate them prior to the end of the session.  KULWINDER demonstrated good  understanding of the education " provided.     See EMR under Patient Instructions for exercises provided prior visit.    Assessment     Kulwinder is a 49 y.o. male referred to outpatient Physical Therapy with a medical diagnosis of acute strain of the lumbar spine. Pt presents with chief c/o R sided lower back pain with referred pain into the R LE due to a work related injury with no reported Hx of prior LBP.   As today was his second visit he required marked cues for proper performance and body mechanics with treatment activities today. Will monitor carry over between sessions and adjust accordingly.   Completed the addition of FDN to the lumbar paraspinals with e-stim today with pt reporting a favorable response post.   Ongoing completion of foundational treatment activities and planning to progress as he is able to tolerate with functional treatment activities as outlined with planned treatment activities above.     Current PDL: Light  Return to Work PDL: Heavy    KULWINDER is progressing well towards his goals.   Pt prognosis is Good.     Pt will continue to benefit from skilled outpatient physical therapy to address the deficits listed in the problem list box on initial evaluation, provide pt/family education and to maximize pt's level of independence in the home and community environment.     Pt's spiritual, cultural and educational needs considered and pt agreeable to plan of care and goals.     Anticipated barriers to physical therapy: acuity of s/s    Goals:   Short Term Goals: 2weeks   1.) Pt will become compliant and independent with her initial HEP to promote decreased pain and improved mobility and strength.   2) Pt to report decrease in pain levels from 8/10 at worse to 6/10 at worse.   3.) Pt will demonstrate a negative SLR/Slump testing to demonstrate improvements with neurodynamics.    4.) Pt will demonstrate full lumbar AROM in all planes with min to no c/o s/s to improve general mobility.   5.) Pt able to demonstrate ability to lift from  floor to waist, waist to shoulder, waist to overhead of 10# to demonstrate improvements and progress with work related tasks.      Long Term Goals: 4 weeks   1.) Pt will become compliant and independent with an updated, progressed HEP to promote decreased pain and improved mobility and strength as well as prep for discharge from further PT intervention.   2) Pt to report decrease in pain levels from 8/10 at worse to 4/10 at worse.   3.) Pt will demonstrate full lumbar AROM in all planes with no c/o s/s to improve general mobility.   4.) Pt able to demonstrate ability to lift from floor to waist, waist to shoulder, waist to overhead of 25# to demonstrate improvements and progress with work related tasks.   5.) Pt will improve her FOTO score from 44% impairment to 28% improvement to indicate improvement in overall function.        Plan     Plan of care Certification: 3/18/2020 to 4/17/2020     Outpatient Physical Therapy 3 times weekly for 4 weeks to include the following interventions: Cervical/Lumbar Traction, Gait Training, Manual Therapy, Moist Heat/ Ice, Neuromuscular Re-ed, Patient Education, Self Care, Therapeutic Activites, Therapeutic Exercise and FDN, Astym, Other soft tissue modalities as needed.      Upon discharge from further skilled PT intervention it will determined if the need for a work conditioning program or Functional Capacity Evaluation is required to allow the injured worker to return to work with full potential achieved, continued improvement with body mechanics with advanced functional activities, and further prevention of future work-related injuries.       Davon Enriquez, PT

## 2020-03-23 ENCOUNTER — CLINICAL SUPPORT (OUTPATIENT)
Dept: REHABILITATION | Facility: HOSPITAL | Age: 50
End: 2020-03-23
Payer: COMMERCIAL

## 2020-03-23 DIAGNOSIS — M54.41 ACUTE RIGHT-SIDED LOW BACK PAIN WITH RIGHT-SIDED SCIATICA: ICD-10-CM

## 2020-03-23 PROCEDURE — 97140 MANUAL THERAPY 1/> REGIONS: CPT | Mod: PN | Performed by: PHYSICAL THERAPIST

## 2020-03-23 PROCEDURE — 97110 THERAPEUTIC EXERCISES: CPT | Mod: PN | Performed by: PHYSICAL THERAPIST

## 2020-03-23 NOTE — PROGRESS NOTES
Physical Therapy Daily Treatment Note     Name: Kulwinder Rodriguez  Clinic Number: [id number]    Therapy Diagnosis:   Encounter Diagnosis   Name Primary?    Acute right-sided low back pain with right-sided sciatica      Physician: Ayush Gaston MD    Visit Date: 3/23/2020    Physician Orders: PT Eval and Treat   Medical Diagnosis from Referral:   M54.41 (ICD-10-CM) - Acute right-sided low back pain with right-sided sciatica   S39.012D (ICD-10-CM) - Strain of lumbar region, subsequent encounter      Evaluation Date: 3/18/2020  Authorization Period Expiration: 2/17/2021  Plan of Care Expiration: 4/17/2020  Visit # / Visits authorized: 4/ 12    Time In: 100 pm  Time Out: 200 pm  Total Billable Time: 60 minutes (2TE, 1 MT)    Precautions: Standard    Subjective     Pt reports: main pain is upon waking up in AM and more in glute and lateral glute. Has not increased activity at home since work has ceased.    He was compliant with home exercise program.  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   Prior Medical Treatment: Medication and Imaging  Current Work Restrictions: Restrictions: No lifting/pushing/pulling more than 10 lbs, Avoid frequent bending/lifting/twisting  Next MD follow up: Follow up in about 3 weeks (around 3/31/2020).    Objective     KULWINDER received therapeutic exercises to develop strength, endurance, ROM, flexibility and core stabilization for 40 minutes including:    - prone press up onto forearms                                  2x10  - Supine LTR                                                               x20  - Supine DKTC with  "Swiss ball   x20  - Supine sciatic nn glides    2x20  - Supine QL stretch (going to L, stretching R) 5" hold x 15  - Supine Bridging     3x10  - Supine TrA bracing     2x10  - Supine Marching holding TrA/PPT   3x10    - Prone press up position hold    3'   - Prone press up to elbow    3x10  - Prone alt arm leg lift     2x10- pillow under belly     - SL hip abduction     3x10 1#    - Seated Swiss ball lumbar flexion roll out     x20  - Seated axial loading (band over shoulders, good to bad posture)  x20BTB    - Standing B shoulder scaption lift   2x10, 5# each UE     KULWINDER received the following manual therapy techniques: Joint mobilizations, Manual traction and Soft tissue Mobilization were applied to the: lumbar spine and R LE for 15 minutes, including:  - R LE LAD 4' in hip open packed position in supine   - Prone lumbar paraspinal ASTYM (R) > (L) - deep tissue   - Prone R UPA L4-S1 Gr IV- 45" x 3 each segment  - FDN to the R side lower lumbar paraspinals x4 with functional e-stim applied for 10 mins HELD    KULWINDER participated in dynamic functional therapeutic activities to improve functional performance for 0  minutes, including:    Future Planned Activities:   Multifidi walking - lateral walks  Horizontal rows  B shoulder pull downs  Mini Squats  Lunging    Kulwinder received  x15 minutes to complete his session today.     Home Exercises Provided and Patient Education Provided     Education provided:   - POC, HEP maintenance, response to treatment    Written Home Exercises Provided: Patient instructed to cont prior HEP.  Exercises were reviewed and KULWINDER was able to demonstrate them prior to the end of the session.  KULWINDER demonstrated good  understanding of the education provided.     See EMR under Patient Instructions for exercises provided prior visit.    Assessment     Kulwinder is a 49 y.o. male referred to outpatient Physical Therapy with a medical diagnosis of acute strain of the lumbar spine. Pt presents with chief " c/o R sided lower back pain with referred pain into the R LE due to a work related injury with no reported Hx of prior LBP.   Pt finished fourth visit since onset of injury and was able to complete increased number of repetitions during therex and hold stretching for longer periods of time. Pt did report increased in symptoms from FDN last visit and was thus held. Pt did responded well to posterior glute ASTYM and soft tissue management.     Current PDL: Light  Return to Work PDL: Heavy    KASIA is progressing well towards his goals.   Pt prognosis is Good.     Pt will continue to benefit from skilled outpatient physical therapy to address the deficits listed in the problem list box on initial evaluation, provide pt/family education and to maximize pt's level of independence in the home and community environment.     Pt's spiritual, cultural and educational needs considered and pt agreeable to plan of care and goals.     Anticipated barriers to physical therapy: acuity of s/s    Goals:   Short Term Goals: 2weeks   1.) Pt will become compliant and independent with her initial HEP to promote decreased pain and improved mobility and strength.   2) Pt to report decrease in pain levels from 8/10 at worse to 6/10 at worse.   3.) Pt will demonstrate a negative SLR/Slump testing to demonstrate improvements with neurodynamics.    4.) Pt will demonstrate full lumbar AROM in all planes with min to no c/o s/s to improve general mobility.   5.) Pt able to demonstrate ability to lift from floor to waist, waist to shoulder, waist to overhead of 10# to demonstrate improvements and progress with work related tasks.      Long Term Goals: 4 weeks   1.) Pt will become compliant and independent with an updated, progressed HEP to promote decreased pain and improved mobility and strength as well as prep for discharge from further PT intervention.   2) Pt to report decrease in pain levels from 8/10 at worse to 4/10 at worse.   3.) Pt will  demonstrate full lumbar AROM in all planes with no c/o s/s to improve general mobility.   4.) Pt able to demonstrate ability to lift from floor to waist, waist to shoulder, waist to overhead of 25# to demonstrate improvements and progress with work related tasks.   5.) Pt will improve her FOTO score from 44% impairment to 28% improvement to indicate improvement in overall function.        Plan     Plan of care Certification: 3/18/2020 to 4/17/2020     Outpatient Physical Therapy 3 times weekly for 4 weeks to include the following interventions: Cervical/Lumbar Traction, Gait Training, Manual Therapy, Moist Heat/ Ice, Neuromuscular Re-ed, Patient Education, Self Care, Therapeutic Activites, Therapeutic Exercise and FDN, Astym, Other soft tissue modalities as needed.      Upon discharge from further skilled PT intervention it will determined if the need for a work conditioning program or Functional Capacity Evaluation is required to allow the injured worker to return to work with full potential achieved, continued improvement with body mechanics with advanced functional activities, and further prevention of future work-related injuries.       Raoul Samuels, PT

## 2020-03-25 ENCOUNTER — CLINICAL SUPPORT (OUTPATIENT)
Dept: REHABILITATION | Facility: HOSPITAL | Age: 50
End: 2020-03-25
Payer: COMMERCIAL

## 2020-03-25 DIAGNOSIS — M54.41 ACUTE RIGHT-SIDED LOW BACK PAIN WITH RIGHT-SIDED SCIATICA: ICD-10-CM

## 2020-03-25 PROCEDURE — 97110 THERAPEUTIC EXERCISES: CPT | Mod: PN

## 2020-03-25 PROCEDURE — 97140 MANUAL THERAPY 1/> REGIONS: CPT | Mod: PN

## 2020-03-25 NOTE — PROGRESS NOTES
Physical Therapy Daily Treatment Note     Name: Kulwinder Rodriguez  Clinic Number: [id number]    Therapy Diagnosis:   No diagnosis found.  Physician: Ayush Gaston MD    Visit Date: 3/25/2020    Physician Orders: PT Eval and Treat   Medical Diagnosis from Referral:   M54.41 (ICD-10-CM) - Acute right-sided low back pain with right-sided sciatica   S39.012D (ICD-10-CM) - Strain of lumbar region, subsequent encounter      Evaluation Date: 3/18/2020  Authorization Period Expiration: 2/17/2021  Plan of Care Expiration: 4/17/2020  Visit # / Visits authorized: 6/12    Time In: 1100am  Time Out: 1200pm  Total Billable Time: 60 minutes (3TE, 1 MT)    Precautions: Standard    Subjective     Pt reports: reports not having so much glute pain, mostly in the lower back today. Reports that he thought he did fine with FDN 2 visits ago, but the next day felt an increase in muscle spasm. Reports after his LV he did better.     He was compliant with home exercise program.  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   Prior Medical Treatment: Medication and Imaging  Current Work Restrictions: Restrictions: No lifting/pushing/pulling more than 10 lbs, Avoid frequent bending/lifting/twisting  Next MD follow up: Follow up in about 3 weeks (around 3/31/2020).    Objective     Screening AROM:   - Flexion: no c/o  - Rotation: no c/o  - Side Bending: discomfort with L side bending, none with R  - Rotation: discomfort with L side bending, none with R  - Extension: Painful, no changes with repeated ext after 15 reps  - R quadrant: + concordant s/s    KULWINDER received therapeutic exercises to develop  "strength, endurance, ROM, flexibility and core stabilization for 40 minutes including:    - prone press up onto forearms                                     2x10  - Supine LTR                                                                  x20  - Supine DKTC with Swiss ball      x20  - Supine sciatic nn glides       2x20  - Supine QL stretch (going to L, stretching R)    5" hold x 15  - Supine Bridging        3x10  - Supine TrA bracing        2x10  - Supine Marching holding TrA/PPT      3x10    - Prone press up position hold       3' (DC'd today)  - Prone press up to elbow       3x10 (DC'd today)  - Prone alt arm leg lift        2x10- pillow under belly (DC'd today)    - SL hip abduction        3x10 1#    - Seated Swiss ball lumbar flexion roll out     x20  - Seated axial loading (band over shoulders, good to bad posture)  x20BTB  - Seated piriformis stretch       3x30" each    - Standing B shoulder scaption lift      2x10, 5# each UE (Not performed today)     KULWINDER received the following manual therapy techniques: Joint mobilizations, Manual traction and Soft tissue Mobilization were applied to the: lumbar spine and R LE for 15 minutes, including:  - R LE LAD 4' in hip open packed position in supine   - Prone lumbar paraspinal ASTYM (R) > (L) - deep tissue (Not today)  - Prone R UPA L4-S1 Gr IV- 45" x 3 each segment  - FDN to the R side lower lumbar paraspinals x4 with functional e-stim applied for 10 mins HELD (Not today)  - R SL flexion gapping technique x 5 mins    KULWINDER participated in dynamic functional therapeutic activities to improve functional performance for 0  minutes, including:    Future Planned Activities:   Multifidi walking - lateral walks  Horizontal rows  B shoulder pull downs  Mini Squats  Lunging    Kulwinder received  x15 minutes to complete his session today.     Home Exercises Provided and Patient Education Provided     Education provided:   - POC, HEP maintenance, response to " treatment    Written Home Exercises Provided: Patient instructed to cont prior HEP.  Exercises were reviewed and KULWINDER was able to demonstrate them prior to the end of the session.  KULWINDER demonstrated good  understanding of the education provided.     See EMR under Patient Instructions for exercises provided prior visit.    Assessment     Kulwinder is a 49 y.o. male referred to outpatient Physical Therapy with a medical diagnosis of acute strain of the lumbar spine. Pt presents with chief c/o R sided lower back pain with referred pain into the R LE due to a work related injury with no reported Hx of prior LBP.   Based on screening performed today adjustments were made to the patients treatment plan with + response per pt. Will plan to re-assess response at his next visit in order to assess carry over and response to changes.     Current PDL: Light  Return to Work PDL: Heavy    KULWINDER is progressing well towards his goals.   Pt prognosis is Good.     Pt will continue to benefit from skilled outpatient physical therapy to address the deficits listed in the problem list box on initial evaluation, provide pt/family education and to maximize pt's level of independence in the home and community environment.     Pt's spiritual, cultural and educational needs considered and pt agreeable to plan of care and goals.     Anticipated barriers to physical therapy: acuity of s/s    Goals:   Short Term Goals: 2weeks   1.) Pt will become compliant and independent with her initial HEP to promote decreased pain and improved mobility and strength.   2) Pt to report decrease in pain levels from 8/10 at worse to 6/10 at worse.   3.) Pt will demonstrate a negative SLR/Slump testing to demonstrate improvements with neurodynamics.    4.) Pt will demonstrate full lumbar AROM in all planes with min to no c/o s/s to improve general mobility.   5.) Pt able to demonstrate ability to lift from floor to waist, waist to shoulder, waist to overhead of 10# to  demonstrate improvements and progress with work related tasks.      Long Term Goals: 4 weeks   1.) Pt will become compliant and independent with an updated, progressed HEP to promote decreased pain and improved mobility and strength as well as prep for discharge from further PT intervention.   2) Pt to report decrease in pain levels from 8/10 at worse to 4/10 at worse.   3.) Pt will demonstrate full lumbar AROM in all planes with no c/o s/s to improve general mobility.   4.) Pt able to demonstrate ability to lift from floor to waist, waist to shoulder, waist to overhead of 25# to demonstrate improvements and progress with work related tasks.   5.) Pt will improve her FOTO score from 44% impairment to 28% improvement to indicate improvement in overall function.        Plan     Plan of care Certification: 3/18/2020 to 4/17/2020     Outpatient Physical Therapy 3 times weekly for 4 weeks to include the following interventions: Cervical/Lumbar Traction, Gait Training, Manual Therapy, Moist Heat/ Ice, Neuromuscular Re-ed, Patient Education, Self Care, Therapeutic Activites, Therapeutic Exercise and FDN, Astym, Other soft tissue modalities as needed.      Upon discharge from further skilled PT intervention it will determined if the need for a work conditioning program or Functional Capacity Evaluation is required to allow the injured worker to return to work with full potential achieved, continued improvement with body mechanics with advanced functional activities, and further prevention of future work-related injuries.       Davon Enriquez, PT

## 2020-03-26 ENCOUNTER — TELEPHONE (OUTPATIENT)
Dept: URGENT CARE | Facility: CLINIC | Age: 50
End: 2020-03-26

## 2020-03-27 ENCOUNTER — CLINICAL SUPPORT (OUTPATIENT)
Dept: REHABILITATION | Facility: HOSPITAL | Age: 50
End: 2020-03-27
Payer: COMMERCIAL

## 2020-03-27 DIAGNOSIS — M54.41 ACUTE RIGHT-SIDED LOW BACK PAIN WITH RIGHT-SIDED SCIATICA: ICD-10-CM

## 2020-03-27 PROCEDURE — 97140 MANUAL THERAPY 1/> REGIONS: CPT | Mod: PN

## 2020-03-27 PROCEDURE — 97110 THERAPEUTIC EXERCISES: CPT | Mod: PN

## 2020-03-27 NOTE — PROGRESS NOTES
Physical Therapy Daily Treatment Note     Name: Kulwinder Rodriguez  Clinic Number: [id number]    Therapy Diagnosis:   Encounter Diagnosis   Name Primary?    Acute right-sided low back pain with right-sided sciatica      Physician: Ayush Gaston MD    Visit Date: 3/27/2020    Physician Orders: PT Eval and Treat   Medical Diagnosis from Referral:   M54.41 (ICD-10-CM) - Acute right-sided low back pain with right-sided sciatica   S39.012D (ICD-10-CM) - Strain of lumbar region, subsequent encounter      Evaluation Date: 3/18/2020  Authorization Period Expiration: 2/17/2021  Plan of Care Expiration: 4/17/2020  Visit # / Visits authorized: 7/12    Time In: 900am  Time Out: 1000am  Total Billable Time: 60 minutes (3TE, 1 MT)    Precautions: Standard    Subjective     Pt reports: reports that he is having ongoing LBP on the Right side. Reports not really feeling the tightness and stiffness anymore; however, the pain is still there. Reports the medication is not really helping his pain. Reports that he was able to do a little work spraying his fence yesterday for 2 hours while sitting. Still unable to cut his own grass due to LBP.      He was compliant with home exercise program.  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   Prior Medical Treatment: Medication and Imaging  Current Work Restrictions: Restrictions: No lifting/pushing/pulling more than 10 lbs, Avoid frequent bending/lifting/twisting  Next MD follow up: Follow up in about 3 weeks (around 3/31/2020)    Objective     Screening AROM:   - Flexion: no c/o  - Rotation: no c/o  - Side Bending: discomfort with L side  "bending, none with R  - Rotation: discomfort with L side bending, none with R  - Extension: Painful, no changes with repeated ext after 10 reps - only increase in pain  - R quadrant: + concordant s/s    KASIA received therapeutic exercises to develop strength, endurance, ROM, flexibility and core stabilization for 40 minutes including:    - prone press up onto forearms                                     2x10  - Supine LTR with Swiss ball                                                            x20  - Supine DKTC with Swiss ball      x20  - Supine sciatic nn glides       2x20  - Supine QL stretch (going to L, stretching R)    5" hold x 15  - SKTC   - Supine Bridging        3x10  - Supine TrA bracing        2x10  - Supine Marching holding TrA/PPT      3x10    - Prone press up position hold       3' (DC'd today)  - Prone press up to elbow       3x10 (DC'd today)  - Prone alt arm leg lift        2x10- pillow under belly (DC'd today)    - SL hip abduction        3x10 1#    - Seated Swiss ball lumbar flexion roll out     x20  - Seated axial loading (band over shoulders, good to bad posture)  [id number]  - Seated piriformis stretch       3x30" each    - Standing B shoulder scaption lift      2x10, 5# each UE (Not performed today)  - Standing hip hike (R)       2x10  - Standing calf stretch       3x30", R     KASIA received the following manual therapy techniques: Joint mobilizations, Manual traction and Soft tissue Mobilization were applied to the: lumbar spine and R LE for 15 minutes, including:  - R LE LAD 4' in hip open packed position in supine   - Prone lumbar paraspinal ASTYM (R) > (L) - deep tissue (Not today)  - Prone R UPA L4-S1 Gr IV- 45" x 3 each segment  - FDN to the R side lower lumbar paraspinals x4 with functional e-stim applied for 10 mins HELD (Not today)  - R SL flexion gapping technique x 2 mins  - R SL HVLAT x2    KASIA participated in dynamic functional therapeutic activities to improve functional " performance for 0  minutes, including:    Future Planned Activities:   Multifidi walking - lateral walks  Horizontal rows  B shoulder pull downs  Mini Squats  Lunging    Kulwinder received MH x15 minutes to complete his session today.     Home Exercises Provided and Patient Education Provided     Education provided:   - POC, HEP maintenance, response to treatment    Written Home Exercises Provided: Patient instructed to cont prior HEP.  Exercises were reviewed and KULWINDER was able to demonstrate them prior to the end of the session.  KULWINDER demonstrated good  understanding of the education provided.     See EMR under Patient Instructions for exercises provided prior visit.    Assessment     Kulwinder is a 49 y.o. male referred to outpatient Physical Therapy with a medical diagnosis of acute strain of the lumbar spine. Pt presents with chief c/o R sided lower back pain with referred pain into the R LE due to a work related injury with no reported Hx of prior LBP.   Based on screening performed last visit and today, adjustments were made to the patients treatment plan with + response per pt. PT now reports tightness and stiffness resolved mostly; however, pain is still lingering. Will plan to re-assess response at his next visit in order to assess carry over and response to changes.     Current PDL: Light  Return to Work PDL: Heavy    KULWINDER is progressing well towards his goals.   Pt prognosis is Good.     Pt will continue to benefit from skilled outpatient physical therapy to address the deficits listed in the problem list box on initial evaluation, provide pt/family education and to maximize pt's level of independence in the home and community environment.     Pt's spiritual, cultural and educational needs considered and pt agreeable to plan of care and goals.     Anticipated barriers to physical therapy: acuity of s/s    Goals:   Short Term Goals: 2weeks   1.) Pt will become compliant and independent with her initial HEP to  promote decreased pain and improved mobility and strength.   2) Pt to report decrease in pain levels from 8/10 at worse to 6/10 at worse.   3.) Pt will demonstrate a negative SLR/Slump testing to demonstrate improvements with neurodynamics.    4.) Pt will demonstrate full lumbar AROM in all planes with min to no c/o s/s to improve general mobility.   5.) Pt able to demonstrate ability to lift from floor to waist, waist to shoulder, waist to overhead of 10# to demonstrate improvements and progress with work related tasks.      Long Term Goals: 4 weeks   1.) Pt will become compliant and independent with an updated, progressed HEP to promote decreased pain and improved mobility and strength as well as prep for discharge from further PT intervention.   2) Pt to report decrease in pain levels from 8/10 at worse to 4/10 at worse.   3.) Pt will demonstrate full lumbar AROM in all planes with no c/o s/s to improve general mobility.   4.) Pt able to demonstrate ability to lift from floor to waist, waist to shoulder, waist to overhead of 25# to demonstrate improvements and progress with work related tasks.   5.) Pt will improve her FOTO score from 44% impairment to 28% improvement to indicate improvement in overall function.        Plan     Plan of care Certification: 3/18/2020 to 4/17/2020     Outpatient Physical Therapy 3 times weekly for 4 weeks to include the following interventions: Cervical/Lumbar Traction, Gait Training, Manual Therapy, Moist Heat/ Ice, Neuromuscular Re-ed, Patient Education, Self Care, Therapeutic Activites, Therapeutic Exercise and FDN, Astym, Other soft tissue modalities as needed.      Upon discharge from further skilled PT intervention it will determined if the need for a work conditioning program or Functional Capacity Evaluation is required to allow the injured worker to return to work with full potential achieved, continued improvement with body mechanics with advanced functional activities,  and further prevention of future work-related injuries.       Davon Enriquez, PT

## 2020-03-30 ENCOUNTER — CLINICAL SUPPORT (OUTPATIENT)
Dept: REHABILITATION | Facility: HOSPITAL | Age: 50
End: 2020-03-30
Payer: COMMERCIAL

## 2020-03-30 DIAGNOSIS — M54.41 ACUTE RIGHT-SIDED LOW BACK PAIN WITH RIGHT-SIDED SCIATICA: ICD-10-CM

## 2020-03-30 PROCEDURE — 97110 THERAPEUTIC EXERCISES: CPT | Mod: PN

## 2020-03-30 PROCEDURE — 97140 MANUAL THERAPY 1/> REGIONS: CPT | Mod: PN

## 2020-03-30 NOTE — PROGRESS NOTES
Physical Therapy Daily Treatment Note     Name: Kulwinder Rodriguez  Clinic Number: [id number]    Therapy Diagnosis:   Encounter Diagnosis   Name Primary?    Acute right-sided low back pain with right-sided sciatica      Physician: Ayush Gaston MD    Visit Date: 3/30/2020    Physician Orders: PT Eval and Treat   Medical Diagnosis from Referral:   M54.41 (ICD-10-CM) - Acute right-sided low back pain with right-sided sciatica   S39.012D (ICD-10-CM) - Strain of lumbar region, subsequent encounter      Evaluation Date: 3/18/2020  Authorization Period Expiration: 2/17/2021  Plan of Care Expiration: 4/17/2020  Visit # / Visits authorized: 8/12    Time In: 1200pm   Time Out: 100pm  Total Billable Time: 60 minutes (3TE, 1 MT)    Precautions: Standard    Subjective     Pt reports: reports that he is having ongoing LBP on the Right side primarily, and not having so much gluteal pain. Reports had a lot of stiffness upon waking, but eased up as he started moving around this morning. Reports he is able to do a little light work around the house, but is taking it easy for the most part due to s/s - reports that he is off of work for at least another week due to COVID19.   Still unable to cut his own grass due to LBP.      He was compliant with home exercise program.  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   Prior Medical Treatment: Medication and Imaging  Current Work Restrictions: Restrictions: No lifting/pushing/pulling more than 10 lbs, Avoid frequent bending/lifting/twisting  Next MD follow up: Follow up in about 3 weeks (around 3/31/2020)    Objective     Screening AROM:  "  - Flexion: minor c/o R sided LBP  - Rotation: no c/o  - Side Bending: discomfort with L side bending, none with R  - Rotation: discomfort with L side bending, none with R  - Extension: Painful, no changes with repeated ext after 10 reps - only increase in pain  - R quadrant: + concordant s/s  + R UPA Mid/lower L spine for LBP  - SLR    KASIA received therapeutic exercises to develop strength, endurance, ROM, flexibility and core stabilization for 40 minutes including:    - prone press up onto forearms                                     2x10  - Supine LTR with Swiss ball                                                            x20  - Supine DKTC with Swiss ball      x20  - Supine sciatic nn glides       2x20  - Supine QL stretch (going to L, stretching R)    5" hold x 15    - Quadruped alt arm leg lift       2x10    - SL hip abduction        3x10 1#    - Seated Swiss ball lumbar flexion roll out     x20  - Seated piriformis stretch       3x30" each    - Standing B shoulder scaption lift      2x10, 5# each UE (Not performed today)  - Standing hip hike (R)       2x10  - Standing calf stretch        3x30", R  - Single leg kneeling chops      2x10, GTB  - Step up with contralateral shoulder pull down    [id number] (stool step up, black band pull down)      Not performed today   - Seated axial loading (band over shoulders, good to bad posture)  [id number] (Not today)   - SKTC          5x10" (Not today)  - Supine Bridging        3x10 (Not today)  - Supine TrA bracing        2x10 (Not today)  - Supine Marching holding TrA/PPT      3x10 (Not today)  - Prone press up position hold       3' (Not today)  - Prone press up to elbow       3x10 (Not today)    KASIA received the following manual therapy techniques: Joint mobilizations, Manual traction and Soft tissue Mobilization were applied to the: lumbar spine and R LE for 15 minutes, including:  - Prone R UPA L3-L5  Gr IV- 45" x 3 each segment (with intermittent STM lumbar " paraspinals)   - R SL HVLAT x2  - R LAD HVLAT x2    Not performed today:   - FDN to the R side lower lumbar paraspinals x4 with functional e-stim applied for 10 mins HELD (Not today)  - R SL flexion gapping technique x 2 mins (Not today)   - Prone lumbar paraspinal ASTYM (R) > (L) - deep tissue (Not today)    Home Exercises Provided and Patient Education Provided     Education provided:   - POC, HEP maintenance, response to treatment    Written Home Exercises Provided: Patient instructed to cont prior HEP.  Exercises were reviewed and KULWINDER was able to demonstrate them prior to the end of the session.  KULWINDER demonstrated good  understanding of the education provided.     See EMR under Patient Instructions for exercises provided prior visit.    Assessment     Kulwinder is a 49 y.o. male referred to outpatient Physical Therapy with a medical diagnosis of acute strain of the lumbar spine. Pt presents with chief c/o R sided lower back pain with occasional referred pain into the R LE due to a work related injury with no reported Hx of prior LBP.   Based on screening performed over the last few treatment sessions, adjustments have been made to the patients treatment plan with + response per pt in regards to stiffness; however, pain continues to persist.   Most c/o currently with lumbar quadrant testing and R UPA PAIVMs though no distal/radiating pain reproduced. Furthermore, he was with a negative SLR test today.   Will plan to re-assess response at his next visit in order to assess carry over and response to changes.     Current PDL: Light  Return to Work PDL: Heavy    KULWINDER is progressing well towards his goals.   Pt prognosis is Good.     Pt will continue to benefit from skilled outpatient physical therapy to address the deficits listed in the problem list box on initial evaluation, provide pt/family education and to maximize pt's level of independence in the home and community environment.     Pt's spiritual, cultural and  educational needs considered and pt agreeable to plan of care and goals.     Anticipated barriers to physical therapy: acuity of s/s    Goals:   Short Term Goals: 2weeks   1.) Pt will become compliant and independent with her initial HEP to promote decreased pain and improved mobility and strength. Met, 3/30/3030  2) Pt to report decrease in pain levels from 8/10 at worse to 6/10 at worse. Not Met, Ongoing 3/30/2020   3.) Pt will demonstrate a negative SLR/Slump testing to demonstrate improvements with neurodynamics.  Met, 3/30/2020  4.) Pt will demonstrate full lumbar AROM in all planes with min to no c/o s/s to improve general mobility. Not Met, Ongoing 3/30/2020   5.) Pt able to demonstrate ability to lift from floor to waist, waist to shoulder, waist to overhead of 10# to demonstrate improvements and progress with work related tasks. Not Met, Ongoing 3/30/2020      Long Term Goals: 4 weeks   1.) Pt will become compliant and independent with an updated, progressed HEP to promote decreased pain and improved mobility and strength as well as prep for discharge from further PT intervention. Not Met, Ongoing 3/30/2020   2) Pt to report decrease in pain levels from 8/10 at worse to 4/10 at worse. Not Met, Ongoing 3/30/2020   3.) Pt will demonstrate full lumbar AROM in all planes with no c/o s/s to improve general mobility. Not Met, Ongoing 3/30/2020   4.) Pt able to demonstrate ability to lift from floor to waist, waist to shoulder, waist to overhead of 25# to demonstrate improvements and progress with work related tasks. Not Met, Ongoing 3/30/2020   5.) Pt will improve her FOTO score from 44% impairment to 28% improvement to indicate improvement in overall function.  Not Met, Ongoing 3/30/2020   Plan     Plan of care Certification: 3/18/2020 to 4/17/2020     Outpatient Physical Therapy 3 times weekly for 4 weeks to include the following interventions: Cervical/Lumbar Traction, Gait Training, Manual Therapy, Moist  Heat/ Ice, Neuromuscular Re-ed, Patient Education, Self Care, Therapeutic Activites, Therapeutic Exercise and FDN, Astym, Other soft tissue modalities as needed.      Upon discharge from further skilled PT intervention it will determined if the need for a work conditioning program or Functional Capacity Evaluation is required to allow the injured worker to return to work with full potential achieved, continued improvement with body mechanics with advanced functional activities, and further prevention of future work-related injuries.       Davon Enriquez, PT

## 2020-03-31 ENCOUNTER — OFFICE VISIT (OUTPATIENT)
Dept: URGENT CARE | Facility: CLINIC | Age: 50
End: 2020-03-31
Payer: COMMERCIAL

## 2020-03-31 ENCOUNTER — CLINICAL SUPPORT (OUTPATIENT)
Dept: REHABILITATION | Facility: HOSPITAL | Age: 50
End: 2020-03-31
Payer: COMMERCIAL

## 2020-03-31 DIAGNOSIS — M54.41 ACUTE RIGHT-SIDED LOW BACK PAIN WITH RIGHT-SIDED SCIATICA: Primary | ICD-10-CM

## 2020-03-31 DIAGNOSIS — M54.41 ACUTE RIGHT-SIDED LOW BACK PAIN WITH RIGHT-SIDED SCIATICA: ICD-10-CM

## 2020-03-31 DIAGNOSIS — S39.012D STRAIN OF LUMBAR REGION, SUBSEQUENT ENCOUNTER: ICD-10-CM

## 2020-03-31 PROCEDURE — 97140 MANUAL THERAPY 1/> REGIONS: CPT | Mod: PN

## 2020-03-31 PROCEDURE — 99214 PR OFFICE/OUTPT VISIT, EST, LEVL IV, 30-39 MIN: ICD-10-PCS | Mod: S$GLB,,, | Performed by: PREVENTIVE MEDICINE

## 2020-03-31 PROCEDURE — 97110 THERAPEUTIC EXERCISES: CPT | Mod: PN

## 2020-03-31 PROCEDURE — 99214 OFFICE O/P EST MOD 30 MIN: CPT | Mod: S$GLB,,, | Performed by: PREVENTIVE MEDICINE

## 2020-03-31 NOTE — PROGRESS NOTES
Subjective:       Patient ID: Kulwinder Rodriguez is a 49 y.o. male.    Chief Complaint: Back Pain    RV- Pt is being seen today for his back pain- Cristian Boydnor - DOI 02/06/2020 -Pt states his pain is a 7/10. Pt  States that PT helps with the pain - pt work status is light duty .LN    Back Pain   This is a recurrent problem. The current episode started 1 to 4 weeks ago. The problem occurs constantly. The problem is unchanged. The pain is present in the lumbar spine. The quality of the pain is described as shooting (sharp). The pain does not radiate. The pain is at a severity of 6/10. The pain is moderate. The pain is the same all the time. The symptoms are aggravated by bending. Stiffness is present in the morning. Pertinent negatives include no abdominal pain, chest pain, headaches, numbness, tingling or weakness. He has tried NSAIDs and muscle relaxant for the symptoms. The treatment provided moderate relief.       Constitution: Negative for chills.   HENT: Negative for congestion, sinus pain and sinus pressure.    Cardiovascular: Negative for chest pain.   Gastrointestinal: Negative for abdominal pain.   Musculoskeletal: Positive for pain, back pain and muscle cramps. Negative for joint pain, pain with walking and muscle ache.   Allergic/Immunologic: Negative for itching and sneezing.   Neurological: Negative for headaches, numbness and tingling.        Objective:      Physical Exam   Constitutional: He is oriented to person, place, and time. He appears well-developed and well-nourished.   HENT:   Head: Normocephalic.   Eyes: Pupils are equal, round, and reactive to light.   Neck: Normal range of motion.   Cardiovascular: Normal rate.   Pulmonary/Chest: Effort normal.   Musculoskeletal: Normal range of motion.        Lumbar back: He exhibits tenderness, pain and spasm. He exhibits no swelling, no edema, no deformity and no laceration.        Back:    Patient continues to complain of pain about his right low back  radiating to his right leg with both palpation and range of motion testing.  He has pain with forward flexion of his back to 90°, extension to 10°, and lateral bending and rotation to 25°.  He has no motor or sensory deficits about his lower extremities.    There is no evidence of symptom magnification with negative axial loading and pseudo trunk rotation.   Neurological: He is alert and oriented to person, place, and time.   No focal neurologic deficits   Skin: Skin is warm and dry.   Psychiatric: He has a normal mood and affect.   Nursing note and vitals reviewed.      Assessment:       1. Acute right-sided low back pain with right-sided sciatica    2. Strain of lumbar region, subsequent encounter        Plan:     patient has begun physical therapy and will continue this 3 times per week for 3 more weeks.  He will continue taking etodolac 400 mg twice a day and Robaxin 500 mg nightly.       Patient Instructions: Daily home exercises/warm soaks, Continue Physical Therapy   Restrictions: No lifting/pushing/pulling more than 10 lbs, Avoid frequent bending/lifting/twisting  Follow up in about 2 weeks (around 4/14/2020).

## 2020-03-31 NOTE — PATIENT INSTRUCTIONS
Mariah Luciano, PT, DPT, cert. DN  Email: [email or web address]  Clinic number: [phone]

## 2020-03-31 NOTE — PROGRESS NOTES
"  Physical Therapy Daily Treatment Note     Name: Kulwinder Rodriguez  Clinic Number: [id number]    Therapy Diagnosis:   Encounter Diagnosis   Name Primary?    Acute right-sided low back pain with right-sided sciatica      Physician: Ayush Gaston MD    Visit Date: 3/31/2020    Physician Orders: PT Eval and Treat   Medical Diagnosis from Referral:   M54.41 (ICD-10-CM) - Acute right-sided low back pain with right-sided sciatica   S39.012D (ICD-10-CM) - Strain of lumbar region, subsequent encounter    Evaluation Date: 3/18/2020  Authorization Period Expiration: 2/17/2021  Plan of Care Expiration: 4/17/2020  Visit # / Visits authorized: 9/12    Time In: 1000  Time Out: 1055  Total Billable Time: 55 minutes     Precautions: Standard    Subjective     Pt reports: pain following therapy varies; sometimes it decreases and sometimes it increases. Follow-up with MD today.   He was compliant with home exercise program.  Response to previous treatment: no change  Functional change: Patient cut grass in the front yard this weekend; patient reports this area is small, only took 2-3 minutes. Patient reports increased pain after performance    Pain: 5/10  Location: right back  and buttocks, denies any radicular s/s currently      Objective     KULWINDER received therapeutic exercises to develop ROM and flexibility for 40 minutes including:    Screening AROM:   - Flexion: Mild R sided low back pain, unchanged after 5 reps. Decreased during 5 reps with R rotation and sidebend.    - Rotation: R sided low back pain with L rotation, none R  - Side Bending: R sided low back pain with L side bending, none R  - Extension: Major R sided low back pain. Decreased following flexion with R rotation and sidebend. Decreased following 2x5 reps of extension + R rotation and side bend     Seated trunk flexion and R sidebend stretch, hands on mat: 5"x10 discharge next  Seated trunk flexion and L sidebend stretch, hands on mat: 5"x10 discharge next  L " "sideying right angle breathing for R lumbar gapping (towel at L3-5 segments, L arm abducted overhead and L hip adducted against mat: 3'  Sidelying R lumbar gap stretch (in L sidelying): 5x30"  Prone press-ups onto elbows, R sidebend bias: x10. Moderate increase in R sided low back pain. Discharge next  Prone press-ups onto elbows, L sidebend bias: x10. Major increase in R sided low back pain. Discharge next  Child's pose, forward: x10  Child's pose, L for R gapping: x10    KASIA received the following manual therapy techniques to the lumbar spine for 15 minutes, including:  Grade II-IV central posterior to anterior mobilizations () to L3-5  Grade II-IV L unilateral posterior to anterior mobilizations at L3-4, L4-5, and L5-S1    Home Exercises Provided and Patient Education Provided:    Education provided:   - Importance of finding a pain pattern vs focus on fluctuations  - HEP update    Written Home Exercises Provided: Yes.  Exercises were reviewed and KASIA was able to demonstrate them prior to the end of the session.  KASIA demonstrated good  understanding of the education provided.     See EMR under Patient Instructions for exercises provided 3/18/2020 and 3/31/2020.    Assessment     Opening restriction of R facet causing R sided low back and radicular pain. Greater pain with contralateral UPAs at L4-5 and L5-S1. Exercises focused on opening via flexion, L sidebend and rotation today.    KASIA is progressing well towards his goals.   Pt prognosis is Good.   Pt will continue to benefit from skilled outpatient physical therapy to address the deficits listed in the problem list box on initial evaluation, provide pt/family education and to maximize pt's level of independence in the home and community environment.     Pt's spiritual, cultural and educational needs considered and pt agreeable to plan of care and goals.  Anticipated barriers to physical therapy: acuity of s/s    Short Term Goals: 2 weeks   1.) Pt will " become compliant and independent with her initial HEP to promote decreased pain and improved mobility and strength. MET 3/30/2020  2) Pt to report decrease in pain levels from 8/10 at worse to 6/10 at worse. PROGRESSING, NOT MET 3/31/2020   3.) Pt will demonstrate a negative SLR/Slump testing to demonstrate improvements with neurodynamics.  MET 3/30/2020  4.) Pt will demonstrate full lumbar AROM in all planes with min to no c/o s/s to improve general mobility. PROGRESSING, NOT MET 3/31/2020   5.) Pt able to demonstrate ability to lift from floor to waist, waist to shoulder, waist to overhead of 10# to demonstrate improvements and progress with work related tasks. PROGRESSING, NOT MET 3/31/2020   Long Term Goals: 4 weeks   1.) Pt will become compliant and independent with an updated, progressed HEP to promote decreased pain and improved mobility and strength as well as prep for discharge from further PT intervention. PROGRESSING, NOT MET 3/31/2020   2) Pt to report decrease in pain levels from 8/10 at worse to 4/10 at worse. PROGRESSING, NOT MET 3/31/2020   3.) Pt will demonstrate full lumbar AROM in all planes with no c/o s/s to improve general mobility. PROGRESSING, NOT MET 3/31/2020   4.) Pt able to demonstrate ability to lift from floor to waist, waist to shoulder, waist to overhead of 25# to demonstrate improvements and progress with work related tasks. PROGRESSING, NOT MET 3/31/2020   5.) Pt will improve her FOTO score from 44% impairment to 28% improvement to indicate improvement in overall function. PROGRESSING, NOT MET 3/31/2020     Plan     Continue UPAs. Add side sit with breathing next. Resume strengthening and core stabilization therapeutic exercises.     Upon discharge from further skilled PT intervention it will determined if the need for a work conditioning program or Functional Capacity Evaluation is required to allow the injured worker to return to work with full potential achieved, continued  improvement with body mechanics with advanced functional activities, and further prevention of future work-related injuries.     Mariah Luciano, PT

## 2020-03-31 NOTE — LETTER
[facility]  [address] [facility], [address]  [place] [address]  Phone: [phone]  Fax: [phone]  [facility]: [phone]    Pt Name: Kulwinder Rodriguez  Injury Date: 02/06/2020   Employee ID: [id number] Date of First Treatment: 03/31/2020   Company: [employer]      Appointment Time: 11:00 AM Arrived: 11:18 a.m.   Provider: [facility] Time Out: 12:30 p.m.     Office Treatment:   EXAM   Restrictions  Continue PT    1. Acute right-sided low back pain with right-sided sciatica    2. Strain of lumbar region, subsequent encounter          Patient Instructions: Daily home exercises/warm soaks, Continue Physical Therapy    Restrictions: No lifting/pushing/pulling more than 10 lbs, Avoid frequent bending/lifting/twisting     Return Appointment: 4/14/2020 at 2:00 p.m.  [place]

## 2020-04-01 ENCOUNTER — CLINICAL SUPPORT (OUTPATIENT)
Dept: REHABILITATION | Facility: HOSPITAL | Age: 50
End: 2020-04-01
Payer: COMMERCIAL

## 2020-04-01 DIAGNOSIS — M54.41 ACUTE RIGHT-SIDED LOW BACK PAIN WITH RIGHT-SIDED SCIATICA: ICD-10-CM

## 2020-04-01 PROCEDURE — 97110 THERAPEUTIC EXERCISES: CPT | Mod: PN

## 2020-04-01 PROCEDURE — 97140 MANUAL THERAPY 1/> REGIONS: CPT | Mod: PN

## 2020-04-01 NOTE — PROGRESS NOTES
"  Physical Therapy Daily Treatment Note     Name: Kulwinder Rodriguez  Clinic Number: [id number]    Therapy Diagnosis:   Encounter Diagnosis   Name Primary?    Acute right-sided low back pain with right-sided sciatica      Physician: Ayush Gaston MD    Visit Date: 4/1/2020    Physician Orders: PT Eval and Treat   Medical Diagnosis from Referral:   M54.41 (ICD-10-CM) - Acute right-sided low back pain with right-sided sciatica   S39.012D (ICD-10-CM) - Strain of lumbar region, subsequent encounter    Evaluation Date: 3/18/2020  Authorization Period Expiration: 2/17/2021  Plan of Care Expiration: 4/17/2020  Visit # / Visits authorized: 10/12    Time In: 1000  Time Out: 1041  Total Billable Time: 41 minutes (1MT, 2TE)    Precautions: Standard    Subjective     Pt reports: pain following therapy varies; sometimes it decreases and sometimes it increases. Follow-up with MD today.   He was compliant with home exercise program.  Response to previous treatment: no change  Functional change: Patient cut grass in the front yard this weekend; patient reports this area is small, only took 2-3 minutes. Patient reports increased pain after performance    Pain: 5/10  Location: right back  and buttocks, denies any radicular s/s currently      Objective     KULWINDER received therapeutic exercises to develop ROM and flexibility for 30 minutes including:    Screening AROM post session:   - Flexion: Min R sided low back pain, unchanged repeated. Increased with L and R rot.     - Rotation: R sided low back pain with L rotation, none R  - Side Bending: R sided low back pain with L side bending, none R  - Extension: Min c/o R sided low back pain. Quadrant R and Left no further increase.     L sideying right angle breathing for R lumbar gapping (towel at L3-5 segments, L arm abducted overhead and L hip adducted against mat: 3' (Manual caudal pelvic glide x1' + manual LAD same position x1')  Sidelying R lumbar gap stretch (in L sidelying): 5x30" " "  Child's pose, forward: x10   Child's pose, L for R gapping: x10   R ITB standing stretch vs wall: 10" x10  R piriformis stretch seated: 3 x30"  R step ups with L UE 10# hold: 2x10 (posterior sling stabilization)    KASIA received the following manual therapy techniques to the lumbar spine for 40 minutes, including:  Grade IV- L unilateral posterior to anterior mobilizations at L3-4, L4-5, and L5-S1 in prone biasing L SB away from painful side  L SL R UPA L4-S1 Grade IV- 3/30"each segment (no pain)    (post SL gapping retest R UPA L4-S1: no change, still painful)  (post prone UPAs retest R UPA L4-S1: no change, still painful)    Home Exercises Provided and Patient Education Provided:    Education provided:   - Importance of finding a pain pattern vs focus on fluctuations  - HEP update    Written Home Exercises Provided: Yes.  Exercises were reviewed and KASIA was able to demonstrate them prior to the end of the session.  KASIA demonstrated good  understanding of the education provided.     See EMR under Patient Instructions for exercises provided 3/18/2020 and 3/31/2020.    Assessment     Opening restriction of R facet causing R sided low back and radicular pain; therefore, adjustments were made to the pt's last visit accordingly. Pt reported with no significant changes in s/s and reported increase in R LE pain post session though feeling good throughout session. Similar goals with program today.   Pt was with again +CS with R UPAs lower lumabr spine in a neutral position and with mild relief post manual intervention. Carry over between and after visits has been poor however. Will monitor response to change and carry over at his next visit. He was provided with an updated HEP at his last visit and encouraged him to continue with this program daily until his next follow up session.     Exercises focused on opening via flexion, L sidebend and rotation today as well as core stabilization.     KASIA is progressing well " towards his goals.   Pt prognosis is Good.   Pt will continue to benefit from skilled outpatient physical therapy to address the deficits listed in the problem list box on initial evaluation, provide pt/family education and to maximize pt's level of independence in the home and community environment.     Pt's spiritual, cultural and educational needs considered and pt agreeable to plan of care and goals.  Anticipated barriers to physical therapy: acuity of s/s    Short Term Goals: 2 weeks   1.) Pt will become compliant and independent with her initial HEP to promote decreased pain and improved mobility and strength. MET 3/30/2020  2) Pt to report decrease in pain levels from 8/10 at worse to 6/10 at worse. PROGRESSING, NOT MET 4/1/2020   3.) Pt will demonstrate a negative SLR/Slump testing to demonstrate improvements with neurodynamics.  MET 3/30/2020  4.) Pt will demonstrate full lumbar AROM in all planes with min to no c/o s/s to improve general mobility. PROGRESSING, NOT MET 4/1/2020   5.) Pt able to demonstrate ability to lift from floor to waist, waist to shoulder, waist to overhead of 10# to demonstrate improvements and progress with work related tasks. PROGRESSING, NOT MET 4/1/2020   Long Term Goals: 4 weeks   1.) Pt will become compliant and independent with an updated, progressed HEP to promote decreased pain and improved mobility and strength as well as prep for discharge from further PT intervention. PROGRESSING, NOT MET 4/1/2020   2) Pt to report decrease in pain levels from 8/10 at worse to 4/10 at worse. PROGRESSING, NOT MET 4/1/2020   3.) Pt will demonstrate full lumbar AROM in all planes with no c/o s/s to improve general mobility. PROGRESSING, NOT MET 4/1/2020   4.) Pt able to demonstrate ability to lift from floor to waist, waist to shoulder, waist to overhead of 25# to demonstrate improvements and progress with work related tasks. PROGRESSING, NOT MET 4/1/2020   5.) Pt will improve her FOTO score  from 44% impairment to 28% improvement to indicate improvement in overall function. PROGRESSING, NOT MET 4/1/2020     Plan     Continue UPAs. Progress strengthening and core stabilization therapeutic exercises as the pt is able to tolerate with goal of progressing to functional strengthening activities as per the patient's workplace demands.     Upon discharge from further skilled PT intervention it will determined if the need for a work conditioning program or Functional Capacity Evaluation is required to allow the injured worker to return to work with full potential achieved, continued improvement with body mechanics with advanced functional activities, and further prevention of future work-related injuries.     Davon Enriquez, PT

## 2020-04-06 ENCOUNTER — CLINICAL SUPPORT (OUTPATIENT)
Dept: REHABILITATION | Facility: HOSPITAL | Age: 50
End: 2020-04-06
Payer: COMMERCIAL

## 2020-04-06 DIAGNOSIS — M54.41 ACUTE RIGHT-SIDED LOW BACK PAIN WITH RIGHT-SIDED SCIATICA: ICD-10-CM

## 2020-04-06 PROCEDURE — 97140 MANUAL THERAPY 1/> REGIONS: CPT | Mod: PN

## 2020-04-06 PROCEDURE — 97110 THERAPEUTIC EXERCISES: CPT | Mod: PN

## 2020-04-06 NOTE — PROGRESS NOTES
Physical Therapy Daily Treatment Note     Name: Kulwinder Rodriguez  Clinic Number: [id number]    Therapy Diagnosis:   No diagnosis found.  Physician: Ayush Gaston MD    Visit Date: 4/6/2020    Physician Orders: PT Eval and Treat   Medical Diagnosis from Referral:   M54.41 (ICD-10-CM) - Acute right-sided low back pain with right-sided sciatica   S39.012D (ICD-10-CM) - Strain of lumbar region, subsequent encounter    Evaluation Date: 3/18/2020  Authorization Period Expiration: 2/17/2021  Plan of Care Expiration: 4/17/2020  Visit # / Visits authorized: 11/12    Time In: 1200pm   Time Out: 100pm   Total Billable Time: 60 minutes (1MT, 3TE)    Precautions: Standard    Subjective     Pt reports: pain following therapy varies still. Reports that he really does not have much pain at all right now, but also reports that if he does any work type activities that he feels the pain. Reports that he feels as though his mobility has improved overall but pain is still mostly there. Does report that he has been able to do a few small odds-and-ins around his home, but nothing heavy.    He was compliant with home exercise program.  Response to previous treatment: no change  Functional change: Patient cut grass in the front yard this weekend; patient reports this area is small, only took 2-3 minutes. Patient reports increased pain after performance    Pain: 5/10  Location: right back  and buttocks, denies any radicular s/s currently      Objective     Observations: Quiet stance with equal weight shift, ASIS/PSIS appears symmetrical    Lumbar AROM :   Lumbar AROM (*) = degrees current Comments   Flexion  46 > 52  R sided LBP and radiates into the R posterior upper leg > no c/o LBP, reports tightness in B HS   Extension  15 (with repeated extension no change in s/s and now 18*) > 18 R sided LBP    R Rotation  80 % > 90% no signs or symptoms, stiffness   L Rotation  50 % > 75% R sided LBP    R Side bending  21 > 25 no signs or symptoms    L Side bending   15 > 20 R sided LBP       · R/L Rot w/ hips stabilized, difference: No  · Quadrant R/L: (+) Right  · Charisse's sign with return to neutral: (-) though aberrant motion noted > no aberrant motion observed today  Neuro screening:   · Dermatomes: WFL  · Myotomes: WFL   · UMN screening: WNL  · Slump testing (for provocation of s/s): (+) for concordant R sided LBP and radicular s/s on the Right > negative B LE, HS tightness identified   · SLR (for provocation of s/s): (+) Right and also (+) Cross SLR test on the (L) > negative B LE  Palpation: increased lumbar paraspinal mm tone, denies ttp to lumbar paraspinals  Muscle length: +90/90 HS on the R LE   Response to LAD and hooklying position: min relief with LAD > no changes  Joint PAIVM testing: No s/s with CPA; however, discomfort with R UPA at L5/S1 > L4/L5     Functional screening: lifting mechanics, pushing, pulling NT due to s/s today - will test at a later date when more appropriate           Physical Demand Level (PDL) Reference   Physical Demand Level (PDL) Occasional: 1-33% of the Workday (O) Frequent: 34-66% of the Workday (F) Constant: 67% of the Workday (C)   Sedentary (S) 0 - 10 lbs. Negligible Negligible   Light (L) 11 - 20 lbs. 0 - 10 lbs, Negligible   Medium (M) 21 - 50 lbs. 11 - 25lbs. 0 - 10 lbs   Heavy (H) 51 - 100 lbs. 26 - 50 lbs. 10 - 20 lbs.   Very Heavy (VH) 100 + lbs. 50+ lbs. 20+ lbs.           X lbs. Current PDL PDL Goal   Lifting Floor to Waist 10lb restrictions  Sedentary Heavy   Lifting Waist to Shoulder 10lb restrictions  Sedentary Heavy   Lifting Shoulder to Overhead 10lb restrictions  Sedentary Heavy   Pushing 10lb restrictions  Sedentary Heavy   Pulling 10lb restrictions  Sedentary Heavy   Carrying 10lb restrictions  Sedentary Heavy      KASIA received therapeutic exercises to develop ROM and flexibility for 30 minutes including:    L sideying right angle breathing for R lumbar gapping (towel at L3-5 segments, L arm  "abducted overhead and L hip adducted against mat: 3' (Manual caudal pelvic glide x1' + manual LAD same position x1')  Sidelying R lumbar gap stretch (in L sidelying): 5x30"   Child's pose, forward: x10   Child's pose, L for R gapping: x10   R ITB standing stretch vs wall: 10" x10 (Out of Time)   R piriformis stretch seated: 3 x30"  R step ups with L UE 10# hold: 2x10 (posterior sling stabilization)    KULWINDER received the following manual therapy techniques to the lumbar spine for 40 minutes, including:  Grade IV- L unilateral posterior to anterior mobilizations at L3-4, L4-5, and L5-S1 in prone biasing L SB away from painful side (Not today)  L SL R UPA L4-S1 Grade IV- 3/30"each segment (no pain)  R HS stretching contract relax 3x30"    Home Exercises Provided and Patient Education Provided:    Education provided:   - Importance of finding a pain pattern vs focus on fluctuations  - HEP update    Written Home Exercises Provided: Yes.  Exercises were reviewed and KULWINDER was able to demonstrate them prior to the end of the session.  KULWINDER demonstrated good  understanding of the education provided.     See EMR under Patient Instructions for exercises provided 3/18/2020 and 3/31/2020.    Assessment     Kulwinder is a 49 y.o. male referred to outpatient Physical Therapy with a medical diagnosis of acute strain of the lumbar spine. Pt presents with chief c/o R sided lower back pain with referred pain into the R LE due to a work related injury with no reported Hx of prior LBP.   On his initial evaluation, he presents with R sided lower back pain with AROM screening with referred pain into the R LE with lumbar flexion as well as + Slump and + SLR testing. Currently he is negative with both slump and SLR testing as well as marked improvements with pain free lumbar AROM. Furthermore, he is with + concordant s/s with lumbar quadrant/Sullivan testing as well as PAIVM testing with limited HS muscle length with + 90/90 testing.     Overall he " has been seen for a total of 11 of 12 authorized PT treatment sessions. He has not met his MMI at this time and feel as though he will continue to benefit from skilled PT intervention in order to reach PLOF and return to work as quickly and safely as possible.     KASIA is progressing well towards his goals.   Pt prognosis is Good.   Pt will continue to benefit from skilled outpatient physical therapy to address the deficits listed in the problem list box on initial evaluation, provide pt/family education and to maximize pt's level of independence in the home and community environment.     Pt's spiritual, cultural and educational needs considered and pt agreeable to plan of care and goals.  Anticipated barriers to physical therapy: acuity of s/s    Short Term Goals: 2 weeks   1.) Pt will become compliant and independent with her initial HEP to promote decreased pain and improved mobility and strength. MET 3/30/2020  2) Pt to report decrease in pain levels from 8/10 at worse to 6/10 at worse. PROGRESSING, NOT MET 4/6/2020   3.) Pt will demonstrate a negative SLR/Slump testing to demonstrate improvements with neurodynamics.  MET 3/30/2020  4.) Pt will demonstrate full lumbar AROM in all planes with min to no c/o s/s to improve general mobility. PROGRESSING, NOT MET 4/6/2020   5.) Pt able to demonstrate ability to lift from floor to waist, waist to shoulder, waist to overhead of 10# to demonstrate improvements and progress with work related tasks. PROGRESSING, NOT MET 4/6/2020   Long Term Goals: 4 weeks   1.) Pt will become compliant and independent with an updated, progressed HEP to promote decreased pain and improved mobility and strength as well as prep for discharge from further PT intervention. PROGRESSING, NOT MET 4/6/2020   2) Pt to report decrease in pain levels from 8/10 at worse to 4/10 at worse. PROGRESSING, NOT MET 4/6/2020   3.) Pt will demonstrate full lumbar AROM in all planes with no c/o s/s to improve  general mobility. PROGRESSING, NOT MET 4/6/2020   4.) Pt able to demonstrate ability to lift from floor to waist, waist to shoulder, waist to overhead of 25# to demonstrate improvements and progress with work related tasks. PROGRESSING, NOT MET 4/6/2020   5.) Pt will improve her FOTO score from 44% impairment to 28% improvement to indicate improvement in overall function. PROGRESSING, NOT MET 4/6/2020     Plan     Continue UPAs. Progress strengthening and core stabilization therapeutic exercises as the pt is able to tolerate with goal of progressing to functional strengthening activities as per the patient's workplace demands.     Upon discharge from further skilled PT intervention it will determined if the need for a work conditioning program or Functional Capacity Evaluation is required to allow the injured worker to return to work with full potential achieved, continued improvement with body mechanics with advanced functional activities, and further prevention of future work-related injuries.     Davon Enriquez, PT

## 2020-04-06 NOTE — PLAN OF CARE
Outpatient Therapy Updated Plan of Care     Visit Date: 4/6/2020    Name: Kulwinder Rodriguez  Clinic Number: [id number]    Therapy Diagnosis:   Encounter Diagnosis   Name Primary?    Acute right-sided low back pain with right-sided sciatica      Physician: Ayush Gaston MD    Physician Orders: PT Eval and Treat   Medical Diagnosis from Referral:   M54.41 (ICD-10-CM) - Acute right-sided low back pain with right-sided sciatica   S39.012D (ICD-10-CM) - Strain of lumbar region, subsequent encounter    Evaluation Date: 3/18/2020  Authorization Period Expiration: 2/17/2021  Plan of Care Expiration: 4/17/2020  Visit # / Visits authorized: 11/12    Precautions: Standard  Functional Level Prior to Evaluation:  Independent, Full duty, Full time    Subjective     Update:   Pt reports: pain following therapy varies still. Reports that he really does not have much pain at all right now, but also reports that if he does any work type activities that he feels the pain. Reports that he feels as though his mobility has improved overall but pain is still mostly there. Does report that he has been able to do a few small odds-and-ins around his home, but nothing heavy.    He was compliant with home exercise program.  Response to previous treatment: no change  Functional change: Patient cut grass in the front yard this weekend; patient reports this area is small, only took 2-3 minutes. Patient reports increased pain after performance    Pain: 5/10  Location: right back  and buttocks, denies any radicular s/s currently        Objective     Update: Observations: Quiet stance with equal weight shift, ASIS/PSIS appears symmetrical    Lumbar AROM :   Lumbar AROM (*) = degrees current Comments   Flexion  46 > 52  R sided LBP and radiates into the R posterior upper leg > no c/o LBP, reports tightness in B HS   Extension  15 (with repeated extension no change in s/s and now 18*) > 18 R sided LBP    R Rotation  80 % > 90% no signs or symptoms,  stiffness   L Rotation  50 % > 75% R sided LBP    R Side bending  21 > 25 no signs or symptoms   L Side bending   15 > 20 R sided LBP       · R/L Rot w/ hips stabilized, difference: No  · Quadrant R/L: (+) Right  · Sylva's sign with return to neutral: (-) though aberrant motion noted > no aberrant motion observed today  Neuro screening:   · Dermatomes: WFL  · Myotomes: WFL   · UMN screening: WNL  · Slump testing (for provocation of s/s): (+) for concordant R sided LBP and radicular s/s on the Right > negative B LE, HS tightness identified   · SLR (for provocation of s/s): (+) Right and also (+) Cross SLR test on the (L) > negative B LE  Palpation: increased lumbar paraspinal mm tone, denies ttp to lumbar paraspinals  Muscle length: +90/90 HS on the R LE   Response to LAD and hooklying position: min relief with LAD > no changes  Joint PAIVM testing: No s/s with CPA; however, discomfort with R UPA at L5/S1 > L4/L5     Functional screening: lifting mechanics, pushing, pulling NT due to s/s today - will test at a later date when more appropriate           Physical Demand Level (PDL) Reference   Physical Demand Level (PDL) Occasional: 1-33% of the Workday (O) Frequent: 34-66% of the Workday (F) Constant: 67% of the Workday (C)   Sedentary (S) 0 - 10 lbs. Negligible Negligible   Light (L) 11 - 20 lbs. 0 - 10 lbs, Negligible   Medium (M) 21 - 50 lbs. 11 - 25lbs. 0 - 10 lbs   Heavy (H) 51 - 100 lbs. 26 - 50 lbs. 10 - 20 lbs.   Very Heavy (VH) 100 + lbs. 50+ lbs. 20+ lbs.           X lbs. Current PDL PDL Goal   Lifting Floor to Waist 10lb restrictions  Sedentary Heavy   Lifting Waist to Shoulder 10lb restrictions  Sedentary Heavy   Lifting Shoulder to Overhead 10lb restrictions  Sedentary Heavy   Pushing 10lb restrictions  Sedentary Heavy   Pulling 10lb restrictions  Sedentary Heavy   Carrying 10lb restrictions  Sedentary Heavy        Assessment     Update: Kulwinder is a 49 y.o. male referred to outpatient Physical Therapy with  a medical diagnosis of acute strain of the lumbar spine. Pt presents with chief c/o R sided lower back pain with referred pain into the R LE due to a work related injury with no reported Hx of prior LBP.   On his initial evaluation, he presents with R sided lower back pain with AROM screening with referred pain into the R LE with lumbar flexion as well as + Slump and + SLR testing. Currently he is negative with both slump and SLR testing as well as marked improvements with pain free lumbar AROM. Furthermore, he is with + concordant s/s with lumbar quadrant/Sullivan testing as well as PAIVM testing with limited HS muscle length with + 90/90 testing.     Overall he has been seen for a total of 11 of 12 authorized PT treatment sessions. He has not met his MMI at this time and feel as though he will continue to benefit from skilled PT intervention in order to reach PLOF and return to work as quickly and safely as possible.     Short Term Goals: 2 weeks   1.) Pt will become compliant and independent with her initial HEP to promote decreased pain and improved mobility and strength. MET 3/30/2020  2) Pt to report decrease in pain levels from 8/10 at worse to 6/10 at worse. PROGRESSING, NOT MET 4/6/2020   3.) Pt will demonstrate a negative SLR/Slump testing to demonstrate improvements with neurodynamics.  MET 3/30/2020  4.) Pt will demonstrate full lumbar AROM in all planes with min to no c/o s/s to improve general mobility. PROGRESSING, NOT MET 4/6/2020   5.) Pt able to demonstrate ability to lift from floor to waist, waist to shoulder, waist to overhead of 10# to demonstrate improvements and progress with work related tasks. PROGRESSING, NOT MET 4/6/2020   Long Term Goals: 4 weeks   1.) Pt will become compliant and independent with an updated, progressed HEP to promote decreased pain and improved mobility and strength as well as prep for discharge from further PT intervention. PROGRESSING, NOT MET 4/6/2020   2) Pt to report  decrease in pain levels from 8/10 at worse to 4/10 at worse. PROGRESSING, NOT MET 4/6/2020   3.) Pt will demonstrate full lumbar AROM in all planes with no c/o s/s to improve general mobility. PROGRESSING, NOT MET 4/6/2020   4.) Pt able to demonstrate ability to lift from floor to waist, waist to shoulder, waist to overhead of 25# to demonstrate improvements and progress with work related tasks. PROGRESSING, NOT MET 4/6/2020   5.) Pt will improve her FOTO score from 44% impairment to 28% improvement to indicate improvement in overall function. PROGRESSING, NOT MET 4/6/2020     Long Term Goal Status:   continue per initial plan of care.  Reasons for Recertification of Therapy:   He has not met his MMI at this time and feel as though he will continue to benefit from skilled PT intervention in order to reach PLOF and return to work as quickly and safely as possible.     Plan     Updated Certification Period: 4/6/2020 to 5/8/2020  Recommended Treatment Plan: 3 times per week for 3 weeks: Cervical/Lumbar Traction, Manual Therapy, Moist Heat/ Ice, Neuromuscular Re-ed, Patient Education, Self Care, Therapeutic Activites and Therapeutic Exercise    Davon Enriquez, PT  4/6/2020      I CERTIFY THE NEED FOR THESE SERVICES FURNISHED UNDER THIS PLAN OF TREATMENT AND WHILE UNDER MY CARE    Physician's comments:        Physician's Signature: ___________________________________________________

## 2020-04-07 ENCOUNTER — CLINICAL SUPPORT (OUTPATIENT)
Dept: REHABILITATION | Facility: HOSPITAL | Age: 50
End: 2020-04-07
Payer: COMMERCIAL

## 2020-04-07 DIAGNOSIS — M54.41 ACUTE RIGHT-SIDED LOW BACK PAIN WITH RIGHT-SIDED SCIATICA: ICD-10-CM

## 2020-04-07 PROCEDURE — 97110 THERAPEUTIC EXERCISES: CPT | Mod: PN

## 2020-04-07 PROCEDURE — 97140 MANUAL THERAPY 1/> REGIONS: CPT | Mod: PN

## 2020-04-07 NOTE — PROGRESS NOTES
"  Physical Therapy Daily Treatment Note     Name: Kulwinder Rodriguez  Clinic Number: [id number]    Therapy Diagnosis:   Encounter Diagnosis   Name Primary?    Acute right-sided low back pain with right-sided sciatica      Physician: Ayush Gaston MD    Visit Date: 4/7/2020    Physician Orders: PT Eval and Treat   Medical Diagnosis from Referral:   M54.41 (ICD-10-CM) - Acute right-sided low back pain with right-sided sciatica   S39.012D (ICD-10-CM) - Strain of lumbar region, subsequent encounter    Evaluation Date: 3/18/2020  Authorization Period Expiration: 2/17/2021  Plan of Care Expiration: 4/6/2020 to 5/8/2020  Visit # / Visits authorized: 12/12, requested additional visits.     Time In: 1000am   Time Out: 1100am   Total Billable Time: 60 minutes (1MT, 3TE)    Precautions: Standard    Subjective     Pt reports: that his pain is a little worse today than yesterday. Denies any adverse response to session yesterday, reports just a little soreness. Also reports that he really did not do much around the house either that he could think of to flare up his s/s.  He was compliant with home exercise program.  Response to previous treatment: no change  Functional change: Patient has been able to complete light yard work around the house lately - reports though still with ongoing LBP with     Pain: 6/10  Location: right back  and buttocks, denies any radicular s/s currently      Objective      KULWINDER received therapeutic exercises to develop ROM and flexibility for 45 minutes including:    L sideying right angle breathing for R lumbar gapping (towel at L3-5 segments, L arm abducted overhead and L hip adducted against mat: 3' (Manual caudal pelvic glide x1' + manual LAD same position x1')  Sidelying R lumbar gap stretch (in L sidelying):  5x30"   Child's pose, forward:     x10   Child's pose, L for R gapping:    x10   R ITB standing stretch vs wall:    10" x10 (Out of Time)   R piriformis stretch seated:     3 x30"  R step ups " "with L UE 10# hold:    2x10 (posterior sling stabilization)  QP Bird Dogs:     2x10  Multifidi walks:     2 laps 10 steps with 10# wt  B Shoulder OH scaption lift   2x10 on Airex pad staggered stance, with 5# wt in each UE  Suitcase lifts:      2x10 on Airex pad with 10# wt  Lateral step up     2x10 1st step R LE on step throughout     KULWINDER received the following manual therapy techniques to the lumbar spine for 15 minutes, including:  Grade IV- L unilateral posterior to anterior mobilizations at L3-4, L4-5, and L5-S1 in prone biasing L SB away from painful side (Not today)  L SL R UPA L4-S1 Grade IV- 3/30"each segment (no pain)  R HS stretching contract relax 3x30"  R LE LAD HVLAT x3    Home Exercises Provided and Patient Education Provided:    Education provided:   - Importance of finding a pain pattern vs focus on fluctuations  - HEP update    Written Home Exercises Provided: Yes.  Exercises were reviewed and KULWINDER was able to demonstrate them prior to the end of the session.  KULWINDER demonstrated good  understanding of the education provided.     See EMR under Patient Instructions for exercises provided 3/18/2020 and 3/31/2020.    Assessment     Kulwinder is a 49 y.o. male referred to outpatient Physical Therapy with a medical diagnosis of acute strain of the lumbar spine. Pt presents with chief c/o R sided lower back pain with referred pain into the R LE due to a work related injury with no reported Hx of prior LBP.   On his initial evaluation, he presents with R sided lower back pain with AROM screening with referred pain into the R LE with lumbar flexion as well as + Slump and + SLR testing. Currently he is negative with both slump and SLR testing as well as marked improvements with pain free lumbar AROM. Furthermore, he is with + concordant s/s with lumbar quadrant/Sullivan testing as well as PAIVM testing with limited HS muscle length with + 90/90 testing.     Overall he has been seen for a total of 12 of 12 authorized " PT treatment sessions. He has not met his MMI at this time and feel as though he will continue to benefit from skilled PT intervention in order to reach PLOF and return to work as quickly and safely as possible.     KASIA is progressing well towards his goals.   Pt prognosis is Good.   Pt will continue to benefit from skilled outpatient physical therapy to address the deficits listed in the problem list box on initial evaluation, provide pt/family education and to maximize pt's level of independence in the home and community environment.     Pt's spiritual, cultural and educational needs considered and pt agreeable to plan of care and goals.  Anticipated barriers to physical therapy: acuity of s/s    Short Term Goals: 2 weeks   1.) Pt will become compliant and independent with her initial HEP to promote decreased pain and improved mobility and strength. MET 3/30/2020  2) Pt to report decrease in pain levels from 8/10 at worse to 6/10 at worse. PROGRESSING, NOT MET 4/7/2020   3.) Pt will demonstrate a negative SLR/Slump testing to demonstrate improvements with neurodynamics.  MET 3/30/2020  4.) Pt will demonstrate full lumbar AROM in all planes with min to no c/o s/s to improve general mobility. PROGRESSING, NOT MET 4/7/2020   5.) Pt able to demonstrate ability to lift from floor to waist, waist to shoulder, waist to overhead of 10# to demonstrate improvements and progress with work related tasks. PROGRESSING, NOT MET 4/7/2020   Long Term Goals: 4 weeks   1.) Pt will become compliant and independent with an updated, progressed HEP to promote decreased pain and improved mobility and strength as well as prep for discharge from further PT intervention. PROGRESSING, NOT MET 4/7/2020   2) Pt to report decrease in pain levels from 8/10 at worse to 4/10 at worse. PROGRESSING, NOT MET 4/7/2020   3.) Pt will demonstrate full lumbar AROM in all planes with no c/o s/s to improve general mobility. PROGRESSING, NOT MET 4/7/2020    4.) Pt able to demonstrate ability to lift from floor to waist, waist to shoulder, waist to overhead of 25# to demonstrate improvements and progress with work related tasks. PROGRESSING, NOT MET 4/7/2020   5.) Pt will improve her FOTO score from 44% impairment to 28% improvement to indicate improvement in overall function. PROGRESSING, NOT MET 4/7/2020     Plan     Plan of care Certification: 3/18/2020 to 4/17/2020     Outpatient Physical Therapy 3 times weekly for 4 weeks to include the following interventions: Cervical/Lumbar Traction, Gait Training, Manual Therapy, Moist Heat/ Ice, Neuromuscular Re-ed, Patient Education, Self Care, Therapeutic Activites, Therapeutic Exercise and FDN, Astym, Other soft tissue modalities as needed.      Upon discharge from further skilled PT intervention it will determined if the need for a work conditioning program or Functional Capacity Evaluation is required to allow the injured worker to return to work with full potential achieved, continued improvement with body mechanics with advanced functional activities, and further prevention of future work-related injuries.     Davon Enriquez, PT

## 2020-04-14 ENCOUNTER — OFFICE VISIT (OUTPATIENT)
Dept: URGENT CARE | Facility: CLINIC | Age: 50
End: 2020-04-14
Payer: COMMERCIAL

## 2020-04-14 DIAGNOSIS — M54.41 ACUTE RIGHT-SIDED LOW BACK PAIN WITH RIGHT-SIDED SCIATICA: Primary | ICD-10-CM

## 2020-04-14 DIAGNOSIS — S39.012D STRAIN OF LUMBAR REGION, SUBSEQUENT ENCOUNTER: ICD-10-CM

## 2020-04-14 DIAGNOSIS — M54.50 LOW BACK PAIN, NON-SPECIFIC: ICD-10-CM

## 2020-04-14 PROCEDURE — 99214 OFFICE O/P EST MOD 30 MIN: CPT | Mod: S$GLB,,, | Performed by: PREVENTIVE MEDICINE

## 2020-04-14 PROCEDURE — 99214 PR OFFICE/OUTPT VISIT, EST, LEVL IV, 30-39 MIN: ICD-10-PCS | Mod: S$GLB,,, | Performed by: PREVENTIVE MEDICINE

## 2020-04-14 NOTE — LETTER
[facility] [place]  [address] [facility], [address]  [place] [address]  Phone: [phone]  Fax: [phone]  [facility]: [id number]    Pt Name: Kulwinder Rodriguez  Injury Date: 02/06/2020   Employee ID:  Date of  Treatment: 04/14/2020   Company: [employer]      Appointment Time: 01:45 PM Arrived: 14:00  PM   Provider: Ayush Gaston MD Time Out:14:59  PM     Office Treatment:   EXAM  CONTINUE PT  LIGHT DUTY    1. Acute right-sided low back pain with right-sided sciatica    2. Strain of lumbar region, subsequent encounter    3. Low back pain, non-specific          Patient Instructions: Daily home exercises/warm soaks, Continue Physical Therapy      Restrictions: No lifting/pushing/pulling more than 10 lbs, Avoid frequent bending/lifting/twisting     Return Appointment: 4/28/2020 at 13:00 PM

## 2020-04-14 NOTE — PROGRESS NOTES
Subjective:       Patient ID: Kulwinder Rodriguez is a 49 y.o. male.    Chief Complaint: Back Pain    ASSEMBLY, BACK, 02/06/2020  PATIENT DOING BETTER, STILL HURTS BADLY IN AM.  CS    Back Pain   This is a new problem. The current episode started more than 1 month ago. The problem occurs intermittently. The problem has been gradually improving since onset. The pain is present in the lumbar spine. The quality of the pain is described as aching. Radiates to: neck and shoulder. The pain is at a severity of 5/10. The pain is moderate. The pain is worse during the day. The symptoms are aggravated by position. Stiffness is present in the morning. Pertinent negatives include no chest pain, dysuria, fever or headaches. He has tried ice and heat for the symptoms. The treatment provided moderate relief.       Constitution: Negative for chills, fatigue and fever.   HENT: Negative for congestion and sore throat.    Neck: Negative for painful lymph nodes.   Cardiovascular: Negative for chest pain and leg swelling.   Eyes: Negative for double vision and blurred vision.   Respiratory: Negative for cough and shortness of breath.    Gastrointestinal: Negative for nausea, vomiting and diarrhea.   Genitourinary: Negative for dysuria, frequency and urgency.   Musculoskeletal: Positive for back pain. Negative for joint pain, joint swelling, muscle cramps and muscle ache.   Skin: Negative for color change, pale and rash.   Allergic/Immunologic: Negative for seasonal allergies.   Neurological: Negative for dizziness, history of vertigo, light-headedness, passing out and headaches.   Hematologic/Lymphatic: Negative for swollen lymph nodes, easy bruising/bleeding and history of blood clots. Does not bruise/bleed easily.   Psychiatric/Behavioral: Negative for nervous/anxious, sleep disturbance and depression. The patient is not nervous/anxious.         Objective:      Physical Exam   Constitutional: He is oriented to person, place, and time. He  appears well-developed and well-nourished.   HENT:   Head: Normocephalic.   Eyes: Pupils are equal, round, and reactive to light.   Neck: Normal range of motion.   Cardiovascular: Normal rate.   Pulmonary/Chest: Effort normal.   Musculoskeletal:        Lumbar back: He exhibits decreased range of motion, tenderness, pain and spasm. He exhibits no swelling, no edema, no deformity and no laceration.        Back:    Patient's back pain is unchanged. He continues to complain of pain about his right low back radiating to his right leg with both palpation and range of motion testing.  He has pain with forward flexion of his back to 90°, extension to 10°, and lateral bending and rotation to 25°.  He has no motor or sensory deficits about his lower extremities.    There is no evidence of symptom magnification with negative axial loading and pseudo trunk rotation.   Neurological: He is alert and oriented to person, place, and time.   No focal neurologic deficits   Skin: Skin is warm and dry.   Psychiatric: He has a normal mood and affect.   Nursing note and vitals reviewed.      Assessment:       1. Acute right-sided low back pain with right-sided sciatica    2. Strain of lumbar region, subsequent encounter        Plan:     patient indicates that he has not had any improvement with the physical therapy and rest at home.  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  As his symptoms of right low back pain radiating to his right lower extremity have not resolved an MRI of the lumbosacral spine will be ordered at this time.       Patient Instructions: Daily home exercises/warm soaks, Continue Physical Therapy   Restrictions: No lifting/pushing/pulling more than 10 lbs, Avoid frequent bending/lifting/twisting  Follow up in about 2 weeks (around 4/28/2020).

## 2020-04-28 ENCOUNTER — OFFICE VISIT (OUTPATIENT)
Dept: URGENT CARE | Facility: CLINIC | Age: 50
End: 2020-04-28
Payer: COMMERCIAL

## 2020-04-28 VITALS — TEMPERATURE: 98 F | OXYGEN SATURATION: 98 %

## 2020-04-28 DIAGNOSIS — M54.41 ACUTE RIGHT-SIDED LOW BACK PAIN WITH RIGHT-SIDED SCIATICA: Primary | ICD-10-CM

## 2020-04-28 DIAGNOSIS — M54.50 LOW BACK PAIN, NON-SPECIFIC: ICD-10-CM

## 2020-04-28 DIAGNOSIS — S39.012D STRAIN OF LUMBAR REGION, SUBSEQUENT ENCOUNTER: ICD-10-CM

## 2020-04-28 PROCEDURE — 99214 OFFICE O/P EST MOD 30 MIN: CPT | Mod: S$GLB,,, | Performed by: PREVENTIVE MEDICINE

## 2020-04-28 PROCEDURE — 99214 PR OFFICE/OUTPT VISIT, EST, LEVL IV, 30-39 MIN: ICD-10-PCS | Mod: S$GLB,,, | Performed by: PREVENTIVE MEDICINE

## 2020-04-28 RX ORDER — ETODOLAC 400 MG/1
400 TABLET, FILM COATED ORAL 2 TIMES DAILY
Qty: 60 TABLET | Refills: 1 | Status: SHIPPED | OUTPATIENT
Start: 2020-04-28 | End: 2020-05-20 | Stop reason: SDUPTHER

## 2020-04-28 RX ORDER — METHOCARBAMOL 500 MG/1
500 TABLET, FILM COATED ORAL NIGHTLY
Qty: 30 TABLET | Refills: 1 | Status: SHIPPED | OUTPATIENT
Start: 2020-04-28 | End: 2020-05-20 | Stop reason: SDUPTHER

## 2020-04-28 NOTE — LETTER
[facility] [place]  [address] [facility], [address]  [facility] [address]  Phone: [phone]  Fax: [phone]  [facility]: [id number]    Pt Name: Kulwinder Rodriguez  Injury Date: 02/06/2020   Employee ID:  Date of  Treatment: 04/28/2020   Company:[employer]      Appointment Time: 12:45 PM Arrived: 13:00   PM   Provider: Ayush Gaston MD Time Out:14:11  PM     Office Treatment:   EXAM  MRI WHEN AUTHORIZED  D/C PHYSICAL THERAPY  LIGHT DUTY    1. Acute right-sided low back pain with right-sided sciatica    2. Strain of lumbar region, subsequent encounter    3. Low back pain, non-specific      Medications Ordered This Encounter   Medications    etodolac (LODINE) 400 MG tablet    methocarbamoL (ROBAXIN) 500 MG Tab      Patient Instructions: Daily home exercises/warm soaks, MRI to be scheduled once authorized, Discontinue Physical Therapy      Restrictions: No lifting/pushing/pulling more than 10 lbs, Avoid frequent bending/lifting/twisting     Return Appointment: 5/5/2020 at 12:30  PM

## 2020-04-28 NOTE — PROGRESS NOTES
Subjective:       Patient ID: Kulwinder Rodriguez is a 49 y.o. male.    Chief Complaint: Back Pain    [facility] Follow-up of Back Pain ( DOI 02- ) Pain score 6/10 with complaints of Now Constant pain in Lower Back since he mowed his lawn and has gone back to work. Still doing Daily home exercises/warm soaks, PT is Finished and he still hasn't heard from anyone about scheduling his MRI. SH      Constitution: Negative for fatigue.   Gastrointestinal: Negative for abdominal pain and bowel incontinence.   Genitourinary: Negative for dysuria, urgency, bladder incontinence and hematuria.   Musculoskeletal: Positive for joint pain, back pain and pain with walking. Negative for muscle cramps and history of spine disorder.   Skin: Negative for rash.   Neurological: Negative for coordination disturbances, numbness and tingling.        Objective:      Physical Exam   Constitutional: He is oriented to person, place, and time. He appears well-developed and well-nourished.   HENT:   Head: Normocephalic.   Eyes: Pupils are equal, round, and reactive to light.   Neck: Normal range of motion.   Cardiovascular: Normal rate.   Pulmonary/Chest: Effort normal.   Musculoskeletal:        Lumbar back: He exhibits decreased range of motion, tenderness, pain and spasm. He exhibits no swelling, no edema, no deformity and no laceration.        Back:    Patient's back pain is unchanged. He continues to complain of pain about his right low back radiating to his right leg with both palpation and range of motion testing.  He has pain with forward flexion of his back to 90°, extension to 10°, and lateral bending and rotation to 25°.  He has no motor or sensory deficits about his lower extremities.    There is no evidence of symptom magnification with negative axial loading and pseudo trunk rotation.   Neurological: He is alert and oriented to person, place, and time.   No focal neurologic deficits   Skin: Skin is warm and dry.   Psychiatric: He has a  normal mood and affect.   Nursing note and vitals reviewed.      Assessment:       1. Acute right-sided low back pain with right-sided sciatica    2. Strain of lumbar region, subsequent encounter    3. Low back pain, non-specific        Plan:       Patient has not had much improvement with physical therapy and exercises he has been doing regularly at home.  His right low back pain radiating to his right hip and leg persists and an MRI of the lumbosacral spine is now in order.  This will be reordered and patient will return in 1 week for review.  Medications Ordered This Encounter   Medications    etodolac (LODINE) 400 MG tablet     Sig: Take 1 tablet (400 mg total) by mouth 2 (two) times daily.     Dispense:  60 tablet     Refill:  1    methocarbamoL (ROBAXIN) 500 MG Tab     Sig: Take 1 tablet (500 mg total) by mouth nightly.     Dispense:  30 tablet     Refill:  1     Patient Instructions: Daily home exercises/warm soaks, MRI to be scheduled once authorized, Discontinue Physical Therapy   Restrictions: No lifting/pushing/pulling more than 10 lbs, Avoid frequent bending/lifting/twisting  Follow up in about 1 week (around 5/5/2020).

## 2020-05-05 ENCOUNTER — OFFICE VISIT (OUTPATIENT)
Dept: URGENT CARE | Facility: CLINIC | Age: 50
End: 2020-05-05
Payer: COMMERCIAL

## 2020-05-05 VITALS — TEMPERATURE: 98 F | OXYGEN SATURATION: 99 %

## 2020-05-05 DIAGNOSIS — S39.012D STRAIN OF LUMBAR REGION, SUBSEQUENT ENCOUNTER: ICD-10-CM

## 2020-05-05 DIAGNOSIS — M54.50 LOW BACK PAIN, NON-SPECIFIC: ICD-10-CM

## 2020-05-05 DIAGNOSIS — M54.41 ACUTE RIGHT-SIDED LOW BACK PAIN WITH RIGHT-SIDED SCIATICA: Primary | ICD-10-CM

## 2020-05-05 PROCEDURE — 99214 PR OFFICE/OUTPT VISIT, EST, LEVL IV, 30-39 MIN: ICD-10-PCS | Mod: S$GLB,,, | Performed by: PREVENTIVE MEDICINE

## 2020-05-05 PROCEDURE — 99214 OFFICE O/P EST MOD 30 MIN: CPT | Mod: S$GLB,,, | Performed by: PREVENTIVE MEDICINE

## 2020-05-05 NOTE — LETTER
[facility]  [address] [facility], [address]  [place] [address]  Phone: [phone]  Fax: [phone]  [facility]: [phone]    Pt Name: Kulwinder Rodriguez  Injury Date: 02/06/2020   Employee ID: [id number] Date of  Treatment: 05/05/2020   Company:[employer]      Appointment Time: 12:15 PM Arrived: 12:29pm   Provider: Ayush Gaston MD Time Out:2:18pm     Office Treatment:     1. Acute right-sided low back pain with right-sided sciatica    2. Strain of lumbar region, subsequent encounter    3. Low back pain, non-specific          Patient Instructions: Daily home exercises/warm soaks    Restrictions: No lifting/pushing/pulling more than 10 lbs, Avoid frequent bending/lifting/twisting     Return Appointment: 5/20/2020 at 12:00pm    LN

## 2020-05-05 NOTE — PROGRESS NOTES
Subjective:       Patient ID: Kulwinder Rodriguez is a 49 y.o. male.    Chief Complaint: Back Pain    RV, Lower back (DOI 2/6/2020) [employer]- Patient states his back feels the same and he still has pain. Since he has been working it still hurts a lot. He describes the pain as aching and burning that comes and goes. Pain level 5/10 on today. He is here for the result of his MRI test and is currently taking Lodine and Robaxin for his back. [place]    Back Pain   This is a recurrent problem. The current episode started more than 1 month ago. The problem occurs daily. The problem is unchanged. The pain is present in the lumbar spine. The quality of the pain is described as burning and aching. The pain does not radiate. The pain is at a severity of 5/10. The pain is moderate. The pain is the same all the time. The symptoms are aggravated by bending and twisting. Pertinent negatives include no abdominal pain, bladder incontinence, bowel incontinence, chest pain, dysuria, fever, headaches, leg pain, numbness, paresis, paresthesias, pelvic pain, perianal numbness, tingling, weakness or weight loss. He has tried muscle relaxant, NSAIDs, ice and heat for the symptoms. The treatment provided mild relief.       Constitution: Negative for fatigue and fever.   HENT: Negative.    Neck: negative.   Cardiovascular: Negative.  Negative for chest pain.   Eyes: Negative.    Respiratory: Negative.    Gastrointestinal: Negative for abdominal pain and bowel incontinence.   Endocrine: negative.   Genitourinary: Negative for dysuria, urgency, bladder incontinence, hematuria and pelvic pain.   Musculoskeletal: Positive for pain, back pain and muscle ache. Negative for muscle cramps and history of spine disorder.   Skin: Negative for rash.   Allergic/Immunologic: Negative.    Neurological: Negative for coordination disturbances, headaches, numbness and tingling.   Hematologic/Lymphatic: Negative.    Psychiatric/Behavioral: Negative.          Objective:      Physical Exam   Constitutional: He is oriented to person, place, and time. He appears well-developed and well-nourished.   HENT:   Head: Normocephalic.   Eyes: Pupils are equal, round, and reactive to light.   Neck: Normal range of motion.   Cardiovascular: Normal rate.   Pulmonary/Chest: Effort normal.   Musculoskeletal:        Lumbar back: He exhibits decreased range of motion, tenderness, pain and spasm. He exhibits no swelling, no edema, no deformity and no laceration.        Back:    Patient's back pain is unchanged. He continues to complain of pain about his right low back radiating to his right leg with both palpation and range of motion testing.  He has pain with forward flexion of his back to 90°, extension to 10°, and lateral bending and rotation to 25°.  He has no motor or sensory deficits about his lower extremities.    There is no evidence of symptom magnification with negative axial loading and pseudo trunk rotation.   Neurological: He is alert and oriented to person, place, and time.   No focal neurologic deficits   Skin: Skin is warm and dry.   Psychiatric: He has a normal mood and affect.   Nursing note and vitals reviewed.      Assessment:       1. Acute right-sided low back pain with right-sided sciatica    2. Strain of lumbar region, subsequent encounter    3. Low back pain, non-specific        Plan:     patient has not yet had the MRI evaluation he underwent of the lumbosacral spine reviewed by the radiologist.  Once I have this report I will call the patient and discussed the results.  He will continue taking etodolac 400 mg twice a day with food and Robaxin 500 mg at night.  He will continue doing warm soaks with exercises at home and his physical therapy remains on hold.       Patient Instructions: Daily home exercises/warm soaks   Restrictions: No lifting/pushing/pulling more than 10 lbs, Avoid frequent bending/lifting/twisting  Follow up in about 15 days (around  5/20/2020).

## 2020-05-20 ENCOUNTER — OFFICE VISIT (OUTPATIENT)
Dept: URGENT CARE | Facility: CLINIC | Age: 50
End: 2020-05-20
Payer: COMMERCIAL

## 2020-05-20 DIAGNOSIS — M51.36 DEGENERATIVE DISC DISEASE, LUMBAR: ICD-10-CM

## 2020-05-20 DIAGNOSIS — M54.50 LOW BACK PAIN, NON-SPECIFIC: ICD-10-CM

## 2020-05-20 DIAGNOSIS — M51.26 HERNIATED INTERVERTEBRAL DISC OF LUMBAR SPINE: ICD-10-CM

## 2020-05-20 DIAGNOSIS — S39.012D STRAIN OF LUMBAR REGION, SUBSEQUENT ENCOUNTER: ICD-10-CM

## 2020-05-20 DIAGNOSIS — M54.41 ACUTE RIGHT-SIDED LOW BACK PAIN WITH RIGHT-SIDED SCIATICA: Primary | ICD-10-CM

## 2020-05-20 PROCEDURE — 99214 OFFICE O/P EST MOD 30 MIN: CPT | Mod: S$GLB,,, | Performed by: PREVENTIVE MEDICINE

## 2020-05-20 PROCEDURE — 99214 PR OFFICE/OUTPT VISIT, EST, LEVL IV, 30-39 MIN: ICD-10-PCS | Mod: S$GLB,,, | Performed by: PREVENTIVE MEDICINE

## 2020-05-20 RX ORDER — ETODOLAC 400 MG/1
400 TABLET, FILM COATED ORAL 2 TIMES DAILY
Qty: 60 TABLET | Refills: 1 | Status: SHIPPED | OUTPATIENT
Start: 2020-05-20

## 2020-05-20 RX ORDER — METHOCARBAMOL 500 MG/1
500 TABLET, FILM COATED ORAL NIGHTLY
Qty: 30 TABLET | Refills: 1 | Status: SHIPPED | OUTPATIENT
Start: 2020-05-20

## 2020-05-20 NOTE — PROGRESS NOTES
Subjective:       Patient ID: Kulwinder Rodriguez is a 49 y.o. male.    Chief Complaint: Back Pain    ASSEMBLY, BACK, DOI 02/06/2020  PATIENT STATES HE STILL HURTS. SOMETIMES IT GET BETTER BUT PRETTY MUCH NO CHANGE .  CS    Back Pain   This is a recurrent problem. The current episode started more than 1 month ago. The problem occurs constantly. The problem has been gradually improving since onset. The pain is present in the lumbar spine. The quality of the pain is described as aching. The pain does not radiate. The pain is at a severity of 7/10. The pain is moderate. The pain is worse during the day. The symptoms are aggravated by bending and twisting. Stiffness is present in the morning. Pertinent negatives include no chest pain, dysuria, fever or headaches. He has tried ice and heat for the symptoms. The treatment provided mild relief.       Constitution: Negative for chills, fatigue and fever.   HENT: Negative for congestion and sore throat.    Neck: Negative for painful lymph nodes.   Cardiovascular: Negative for chest pain and leg swelling.   Eyes: Negative for double vision and blurred vision.   Respiratory: Negative for cough and shortness of breath.    Gastrointestinal: Negative for nausea, vomiting and diarrhea.   Genitourinary: Negative for dysuria, frequency and urgency.   Musculoskeletal: Positive for back pain. Negative for joint pain, joint swelling, muscle cramps and muscle ache.   Skin: Negative for color change, pale and rash.   Allergic/Immunologic: Negative for seasonal allergies.   Neurological: Negative for dizziness, history of vertigo, light-headedness, passing out and headaches.   Hematologic/Lymphatic: Negative for swollen lymph nodes, easy bruising/bleeding and history of blood clots. Does not bruise/bleed easily.   Psychiatric/Behavioral: Negative for nervous/anxious, sleep disturbance and depression. The patient is not nervous/anxious.         Objective:      Physical Exam   Constitutional: He  is oriented to person, place, and time. He appears well-developed and well-nourished.   HENT:   Head: Normocephalic.   Eyes: Pupils are equal, round, and reactive to light.   Neck: Normal range of motion.   Cardiovascular: Normal rate.   Pulmonary/Chest: Effort normal.   Musculoskeletal:        Lumbar back: He exhibits decreased range of motion, tenderness, pain and spasm. He exhibits no swelling, no edema, no deformity and no laceration.        Back:    Patient's back pain is unchanged. He continues to complain of pain about his right low back radiating to his right leg with both palpation and range of motion testing.  He has pain with forward flexion of his back to 90°, extension to 10°, and lateral bending and rotation to 25°.  He has no motor or sensory deficits about his lower extremities.    There is no evidence of symptom magnification with negative axial loading and pseudo trunk rotation.   Neurological: He is alert and oriented to person, place, and time.   No focal neurologic deficits   Skin: Skin is warm and dry.   Psychiatric: He has a normal mood and affect.   Nursing note and vitals reviewed.      Assessment:       1. Acute right-sided low back pain with right-sided sciatica    2. Strain of lumbar region, subsequent encounter    3. Low back pain, non-specific    4. Degenerative disc disease, lumbar    5. Herniated intervertebral disc of lumbar spine        Plan:     discussed at length the results of the MRI of the lumbosacral spine which revealed multilevel degenerative changes of the lumbar disc with bulging and herniation of the disc primarily at the L5-S1 and L4-L5 level.  Therefore patient will be referred to neuro surgery for consultation and treatment.  He will continue taking Lodine 400 mg twice a day with food and Robaxin 500 mg primarily at night.  He will continue previously prescribed exercises for his low back muscles.    Medications Ordered This Encounter   Medications    etodolac  (LODINE) 400 MG tablet     Sig: Take 1 tablet (400 mg total) by mouth 2 (two) times daily.     Dispense:  60 tablet     Refill:  1    methocarbamoL (ROBAXIN) 500 MG Tab     Sig: Take 1 tablet (500 mg total) by mouth nightly.     Dispense:  30 tablet     Refill:  1     Patient Instructions: Daily home exercises/warm soaks, Referral to specialist to be scheduled, once authorized(Patient will be referred to neuro surgery to evaluate the results of his MRI)   Restrictions: No lifting/pushing/pulling more than 10 lbs, Avoid frequent bending/lifting/twisting  Follow up in about 4 weeks (around 6/17/2020).

## 2020-05-20 NOTE — LETTER
[facility] [place]  [address], [address]  [place] [address]  Phone: [phone]  Fax: [phone]  [facility]: [id number]    Pt Name: Kulwinder Rodriguez  Injury Date: 02/06/2020   Employee ID: [id number] Date of  Treatment: 05/20/2020   Company: [employer]      Appointment Time: 11:45 AM Arrived: 12:00  PM   Provider: Ayush Gaston MD Time Out:2:10  PM     Office Treatment:   EXAM  LIGHT DUTY        1. Acute right-sided low back pain with right-sided sciatica    2. Strain of lumbar region, subsequent encounter    3. Low back pain, non-specific    4. Degenerative disc disease, lumbar    5. Herniated intervertebral disc of lumbar spine      Medications Ordered This Encounter   Medications    etodolac (LODINE) 400 MG tablet    methocarbamoL (ROBAXIN) 500 MG Tab      Patient Instructions: Daily home exercises/warm soaks, Referral to specialist to be scheduled, once authorized(Patient will be referred to neuro surgery to evaluate the results of his MRI)      Restrictions: No lifting/pushing/pulling more than 10 lbs, Avoid frequent bending/lifting/twisting     Return Appointment: 6/17/2020 at 13:00  PM

## 2020-10-16 PROBLEM — M54.50 LOWER BACK PAIN: Status: RESOLVED | Noted: 2020-03-18 | Resolved: 2020-10-16
